# Patient Record
Sex: FEMALE | Race: OTHER | NOT HISPANIC OR LATINO | URBAN - METROPOLITAN AREA
[De-identification: names, ages, dates, MRNs, and addresses within clinical notes are randomized per-mention and may not be internally consistent; named-entity substitution may affect disease eponyms.]

---

## 2017-05-28 ENCOUNTER — EMERGENCY (EMERGENCY)
Facility: HOSPITAL | Age: 62
LOS: 0 days | Discharge: HOME | End: 2017-05-28

## 2017-06-28 DIAGNOSIS — I10 ESSENTIAL (PRIMARY) HYPERTENSION: ICD-10-CM

## 2017-06-28 DIAGNOSIS — N12 TUBULO-INTERSTITIAL NEPHRITIS, NOT SPECIFIED AS ACUTE OR CHRONIC: ICD-10-CM

## 2017-06-28 DIAGNOSIS — R50.9 FEVER, UNSPECIFIED: ICD-10-CM

## 2018-07-29 ENCOUNTER — EMERGENCY (EMERGENCY)
Facility: HOSPITAL | Age: 63
LOS: 0 days | Discharge: HOME | End: 2018-07-30
Admitting: PHYSICIAN ASSISTANT

## 2018-07-29 VITALS
RESPIRATION RATE: 18 BRPM | TEMPERATURE: 98 F | HEART RATE: 71 BPM | OXYGEN SATURATION: 98 % | DIASTOLIC BLOOD PRESSURE: 72 MMHG | SYSTOLIC BLOOD PRESSURE: 164 MMHG

## 2018-07-29 VITALS
WEIGHT: 133.38 LBS | DIASTOLIC BLOOD PRESSURE: 92 MMHG | OXYGEN SATURATION: 97 % | HEART RATE: 83 BPM | SYSTOLIC BLOOD PRESSURE: 202 MMHG | TEMPERATURE: 98 F | RESPIRATION RATE: 18 BRPM

## 2018-07-29 DIAGNOSIS — M54.41 LUMBAGO WITH SCIATICA, RIGHT SIDE: ICD-10-CM

## 2018-07-29 DIAGNOSIS — M54.5 LOW BACK PAIN: ICD-10-CM

## 2018-07-29 DIAGNOSIS — I10 ESSENTIAL (PRIMARY) HYPERTENSION: ICD-10-CM

## 2018-07-29 DIAGNOSIS — Z79.899 OTHER LONG TERM (CURRENT) DRUG THERAPY: ICD-10-CM

## 2018-07-30 RX ORDER — METHOCARBAMOL 500 MG/1
1 TABLET, FILM COATED ORAL
Qty: 14 | Refills: 0
Start: 2018-07-30 | End: 2018-08-05

## 2018-07-30 RX ORDER — METHOCARBAMOL 500 MG/1
500 TABLET, FILM COATED ORAL ONCE
Qty: 0 | Refills: 0 | Status: COMPLETED | OUTPATIENT
Start: 2018-07-30 | End: 2018-07-30

## 2018-07-30 RX ORDER — KETOROLAC TROMETHAMINE 30 MG/ML
30 SYRINGE (ML) INJECTION ONCE
Qty: 0 | Refills: 0 | Status: DISCONTINUED | OUTPATIENT
Start: 2018-07-30 | End: 2018-07-30

## 2018-07-30 RX ADMIN — Medication 30 MILLIGRAM(S): at 00:45

## 2018-07-30 RX ADMIN — METHOCARBAMOL 500 MILLIGRAM(S): 500 TABLET, FILM COATED ORAL at 00:45

## 2018-07-30 NOTE — ED PROVIDER NOTE - OBJECTIVE STATEMENT
pt c/o lower back pain, right sided radiating down right leg for a few days  similar episodes in the past, but unsure if she took anything for it  pt has not tried anything at home  presents with son who translates as per pts request  Denies incontinence/numbness/saddle parathesia/legs weakness and difficulty on ambulation. denies fever/chill/HA/dizziness/chest pain/sob/abd pain/n/v/d/ urinary sxs

## 2018-07-30 NOTE — ED PROVIDER NOTE - PROGRESS NOTE DETAILS
c/w mechanical back pain.  no neuro red flags.  sx are reproducible with position. aorta/cardiac not supported.  imaging not indicated at this time.  Discussed side effects from NSAIDs and muscle relaxant  Risk GI upset/gastritis/GERD from NSAIDs. May take OTC Prilosec.  Advised not to drive or operate heavy machinery while on Flexeril due to sedation risk. May take only at night if sedation occurs. pt feeling much better, ambulating without assistance

## 2020-01-31 NOTE — ED PROVIDER NOTE - SKIN, MLM
Spoke to patient son Edil Kumar.     Vandana Doan RN     Skin normal color for race, warm, dry and intact. No evidence of rash.

## 2020-04-03 ENCOUNTER — INPATIENT (INPATIENT)
Facility: HOSPITAL | Age: 65
LOS: 12 days | Discharge: HOME HEALTH | End: 2020-04-16
Attending: INTERNAL MEDICINE | Admitting: INTERNAL MEDICINE
Payer: MEDICARE

## 2020-04-03 VITALS
HEART RATE: 92 BPM | RESPIRATION RATE: 20 BRPM | OXYGEN SATURATION: 94 % | SYSTOLIC BLOOD PRESSURE: 119 MMHG | DIASTOLIC BLOOD PRESSURE: 59 MMHG | TEMPERATURE: 100 F

## 2020-04-03 PROBLEM — I10 ESSENTIAL (PRIMARY) HYPERTENSION: Chronic | Status: ACTIVE | Noted: 2018-07-30

## 2020-04-03 LAB
ALBUMIN SERPL ELPH-MCNC: 3.8 G/DL — SIGNIFICANT CHANGE UP (ref 3.5–5.2)
ALP SERPL-CCNC: 93 U/L — SIGNIFICANT CHANGE UP (ref 30–115)
ALT FLD-CCNC: 35 U/L — SIGNIFICANT CHANGE UP (ref 0–41)
ANION GAP SERPL CALC-SCNC: 17 MMOL/L — HIGH (ref 7–14)
AST SERPL-CCNC: 66 U/L — HIGH (ref 0–41)
BASOPHILS # BLD AUTO: 0.02 K/UL — SIGNIFICANT CHANGE UP (ref 0–0.2)
BASOPHILS NFR BLD AUTO: 0.3 % — SIGNIFICANT CHANGE UP (ref 0–1)
BILIRUB SERPL-MCNC: 0.3 MG/DL — SIGNIFICANT CHANGE UP (ref 0.2–1.2)
BUN SERPL-MCNC: 16 MG/DL — SIGNIFICANT CHANGE UP (ref 10–20)
CALCIUM SERPL-MCNC: 8.7 MG/DL — SIGNIFICANT CHANGE UP (ref 8.5–10.1)
CHLORIDE SERPL-SCNC: 96 MMOL/L — LOW (ref 98–110)
CO2 SERPL-SCNC: 23 MMOL/L — SIGNIFICANT CHANGE UP (ref 17–32)
CREAT SERPL-MCNC: 1.1 MG/DL — SIGNIFICANT CHANGE UP (ref 0.7–1.5)
EOSINOPHIL # BLD AUTO: 0 K/UL — SIGNIFICANT CHANGE UP (ref 0–0.7)
EOSINOPHIL NFR BLD AUTO: 0 % — SIGNIFICANT CHANGE UP (ref 0–8)
GLUCOSE SERPL-MCNC: 111 MG/DL — HIGH (ref 70–99)
HCT VFR BLD CALC: 37 % — SIGNIFICANT CHANGE UP (ref 37–47)
HGB BLD-MCNC: 12.9 G/DL — SIGNIFICANT CHANGE UP (ref 12–16)
IMM GRANULOCYTES NFR BLD AUTO: 0.5 % — HIGH (ref 0.1–0.3)
LYMPHOCYTES # BLD AUTO: 1.13 K/UL — LOW (ref 1.2–3.4)
LYMPHOCYTES # BLD AUTO: 17.7 % — LOW (ref 20.5–51.1)
MCHC RBC-ENTMCNC: 30.2 PG — SIGNIFICANT CHANGE UP (ref 27–31)
MCHC RBC-ENTMCNC: 34.9 G/DL — SIGNIFICANT CHANGE UP (ref 32–37)
MCV RBC AUTO: 86.7 FL — SIGNIFICANT CHANGE UP (ref 81–99)
MONOCYTES # BLD AUTO: 0.77 K/UL — HIGH (ref 0.1–0.6)
MONOCYTES NFR BLD AUTO: 12.1 % — HIGH (ref 1.7–9.3)
NEUTROPHILS # BLD AUTO: 4.43 K/UL — SIGNIFICANT CHANGE UP (ref 1.4–6.5)
NEUTROPHILS NFR BLD AUTO: 69.4 % — SIGNIFICANT CHANGE UP (ref 42.2–75.2)
NRBC # BLD: 0 /100 WBCS — SIGNIFICANT CHANGE UP (ref 0–0)
NT-PROBNP SERPL-SCNC: 115 PG/ML — SIGNIFICANT CHANGE UP (ref 0–300)
PLATELET # BLD AUTO: 198 K/UL — SIGNIFICANT CHANGE UP (ref 130–400)
POTASSIUM SERPL-MCNC: 4.3 MMOL/L — SIGNIFICANT CHANGE UP (ref 3.5–5)
POTASSIUM SERPL-SCNC: 4.3 MMOL/L — SIGNIFICANT CHANGE UP (ref 3.5–5)
PROT SERPL-MCNC: 7 G/DL — SIGNIFICANT CHANGE UP (ref 6–8)
RBC # BLD: 4.27 M/UL — SIGNIFICANT CHANGE UP (ref 4.2–5.4)
RBC # FLD: 12.3 % — SIGNIFICANT CHANGE UP (ref 11.5–14.5)
SODIUM SERPL-SCNC: 136 MMOL/L — SIGNIFICANT CHANGE UP (ref 135–146)
TROPONIN T SERPL-MCNC: <0.01 NG/ML — SIGNIFICANT CHANGE UP
WBC # BLD: 6.38 K/UL — SIGNIFICANT CHANGE UP (ref 4.8–10.8)
WBC # FLD AUTO: 6.38 K/UL — SIGNIFICANT CHANGE UP (ref 4.8–10.8)

## 2020-04-03 PROCEDURE — 71045 X-RAY EXAM CHEST 1 VIEW: CPT | Mod: 26

## 2020-04-03 PROCEDURE — 93010 ELECTROCARDIOGRAM REPORT: CPT

## 2020-04-03 PROCEDURE — 99285 EMERGENCY DEPT VISIT HI MDM: CPT

## 2020-04-03 RX ORDER — NIFEDIPINE 30 MG
30 TABLET, EXTENDED RELEASE 24 HR ORAL DAILY
Refills: 0 | Status: DISCONTINUED | OUTPATIENT
Start: 2020-04-03 | End: 2020-04-05

## 2020-04-03 RX ORDER — METOPROLOL TARTRATE 50 MG
1 TABLET ORAL
Qty: 0 | Refills: 0 | DISCHARGE

## 2020-04-03 RX ORDER — ENOXAPARIN SODIUM 100 MG/ML
40 INJECTION SUBCUTANEOUS AT BEDTIME
Refills: 0 | Status: DISCONTINUED | OUTPATIENT
Start: 2020-04-03 | End: 2020-04-12

## 2020-04-03 RX ORDER — METOPROLOL TARTRATE 50 MG
0 TABLET ORAL
Qty: 0 | Refills: 0 | DISCHARGE

## 2020-04-03 RX ORDER — METOPROLOL TARTRATE 50 MG
25 TABLET ORAL DAILY
Refills: 0 | Status: DISCONTINUED | OUTPATIENT
Start: 2020-04-03 | End: 2020-04-05

## 2020-04-03 RX ORDER — NIFEDIPINE 30 MG
1 TABLET, EXTENDED RELEASE 24 HR ORAL
Qty: 0 | Refills: 0 | DISCHARGE

## 2020-04-03 RX ADMIN — ENOXAPARIN SODIUM 40 MILLIGRAM(S): 100 INJECTION SUBCUTANEOUS at 23:00

## 2020-04-03 NOTE — H&P ADULT - HISTORY OF PRESENT ILLNESS
64 year old female [place] speaking with PMHx of hypertension presents with fevers and cough for 6 days.  Patient reports she has not experienced any shortness of breath. Admits to body aches.      Denies chest pain, diarrhea, n/v, abdominal pain, headache, dizziness.

## 2020-04-03 NOTE — ED ADULT NURSE NOTE - CHIEF COMPLAINT QUOTE
SOB and fever x 3days, sent in by [facility] for hypoxia, improved with O2 3L NC,  is being tested for COVID-19

## 2020-04-03 NOTE — ED ADULT TRIAGE NOTE - CHIEF COMPLAINT QUOTE
SOB and fever x 3days, sent in by [facility] for hypoxia, improved with O2 3L NC,  is being tested for COVID-19

## 2020-04-03 NOTE — ED PROVIDER NOTE - CARE PLAN
Principal Discharge DX:	Hypoxia  Secondary Diagnosis:	Shortness of breath  Secondary Diagnosis:	Opacity of lung on imaging study

## 2020-04-03 NOTE — H&P ADULT - NSHPPHYSICALEXAM_GEN_ALL_CORE
T(F): 99.7 (04-03-20 @ 15:51), Max: 99.7 (04-03-20 @ 15:51)  HR: 81 (04-03-20 @ 17:15) (81 - 92)  BP: 116/57 (04-03-20 @ 17:15) (116/57 - 119/59)  RR: 20 (04-03-20 @ 17:15) (20 - 20)  SpO2: 97% (04-03-20 @ 17:15) (94% - 97%) on 2 L NC

## 2020-04-03 NOTE — H&P ADULT - ASSESSMENT
64 year old female [place] speaking with PMHx of hypertension presents with fevers and cough for 6 days.     #Fevers/Non-productive cough/ SOB - rule out respiratory viral syndrome.  Rule out COVID-19  - No recent travel or CoVid contacts (that patient is aware of).   - First symptom onset: 3/28/20  - Isolation precautions. Limit amount of healthcare professional contact.   - f/u COVID19 swab (received by lab)  - f/u procalcitonin, CRP,    - Start on hydroxychloroquine (CXR changes and hypoxic resp. failure requiring supplemental oxygen) (400 mg BID X 2 doses, then 200 mg BID X 5 days)  - Saturating 94% on Room air and 97% on (2L NC)  - Tylenol PRN for fevers  - EP for QTC monitoring    Diet: Regular  Prophylaxis: enoxaparin  Activity: out of bed to chair + ambulate as tolerated  CHG bath  #Dispo: From home    #Code Status: Full code

## 2020-04-03 NOTE — ED ADULT NURSE NOTE - NSIMPLEMENTINTERV_GEN_ALL_ED
Implemented All Universal Safety Interventions:  [place] to call system. Call bell, personal items and telephone within reach. Instruct patient to call for assistance. Room bathroom lighting operational. Non-slip footwear when patient is off stretcher. Physically safe environment: no spills, clutter or unnecessary equipment. Stretcher in lowest position, wheels locked, appropriate side rails in place.

## 2020-04-03 NOTE — ED PROVIDER NOTE - OBJECTIVE STATEMENT
65yo F with PMH of HTN presenting to ED with SOB and intermittent fevers x 6 days (tmax of 103).  was recently sick and tested for covid, results pending. Denies any HA, significant cough, CP, abdominal pain, n/v/d, injuries/traumas/falls, or other complaints. Patient was sent in by  2/2 being hypoxic to low 90's, placed on 3L of NC and responded well.

## 2020-04-03 NOTE — H&P ADULT - ATTENDING COMMENTS
HPI:  64 year old female Georgian speaking with PMHx of hypertension presents with fevers and cough for 6 days.  Patient reports she has not experienced any shortness of breath. Admits to body aches.      Denies chest pain, diarrhea, n/v, abdominal pain, headache, dizziness. (03 Apr 2020 19:15)    REVIEW OF SYSTEMS: see cc/HPI  CONSTITUTIONAL: No weakness, fevers or chills  EYES/ENT: No visual changes;  No vertigo or throat pain   NECK: No pain or stiffness  RESPIRATORY: No cough, wheezing, hemoptysis; No shortness of breath  CARDIOVASCULAR: No chest pain or palpitations  GASTROINTESTINAL: No abdominal or epigastric pain. No nausea, vomiting, or hematemesis; No diarrhea or constipation. No melena or hematochezia.  GENITOURINARY: No dysuria, frequency or hematuria  NEUROLOGICAL: No numbness or weakness  SKIN: No itching, rashes    Physical Exam:    General: WN/WD NAD  Neurology: A&Ox3, nonfocal, follows commands  Eyes: PERRLA/ EOMI  ENT/Neck: Neck supple, trachea midline, No JVD  Respiratory: CTA B/L, No wheezing, rales, rhonchi  CV: Normal rate regular rhythm, S1S2, no murmurs, rubs or gallops  Abdominal: Soft, NT, ND +BS,   Extremities: No edema, + peripheral pulses  Skin: No Rashes, Hematoma, Ecchymosis  Incisions:   Tubes: HPI:  64 year old female Scottish speaking with PMHx of hypertension presents with fevers and cough for 6 days.  Patient reports she has not experienced any shortness of breath. Admits to body aches.      Denies chest pain, diarrhea, n/v, abdominal pain, headache, dizziness. (03 Apr 2020 19:15)    REVIEW OF SYSTEMS: see cc/HPI  CONSTITUTIONAL: No weakness, fevers or chills  EYES/ENT: No visual changes;  No vertigo or throat pain   NECK: No pain or stiffness  RESPIRATORY: (+) cough, wheezing, hemoptysis; (+) shortness of breath, see cc/HPI  CARDIOVASCULAR: No chest pain or palpitations  GASTROINTESTINAL: No abdominal or epigastric pain. No nausea, vomiting, or hematemesis; No diarrhea or constipation. No melena or hematochezia.  GENITOURINARY: No dysuria, frequency or hematuria  NEUROLOGICAL: No numbness or weakness  SKIN: No itching, rashes    Physical Exam:  General: WN/WD NAD  Neurology: A&Ox3, nonfocal, follows commands  Eyes: PERRLA/ EOMI  ENT/Neck: Neck supple, trachea midline, No JVD  Respiratory: B/L rales, No wheezing, NO rhonchi  CV: Normal rate regular rhythm, S1S2, no murmurs, rubs or gallops  Abdominal: Soft, NT, ND +BS,   Extremities: No edema, + peripheral pulses  Skin: No Rashes, Hematoma, Ecchymosis  Incisions:   Tubes:    A/p  Fever/ cough/ dyspnea / hypoxemia - Acute resp failure / bilateral pneumonia r/o COVID-19  -Isolation   -O2 supplementation , pulse ox monitoring   -Agree w/ Plaquenil  -CRP and Procalcitonin   -Tylenol PRN   -ID eval     HTN - stable     DVT prophylaxis

## 2020-04-03 NOTE — ED PROVIDER NOTE - PROGRESS NOTE DETAILS
Raheel- Patient hypoxic when off O2 (87% off O2), O2 sat high 90's on 3L NC. CXR with b/l opacities. Will admit.

## 2020-04-03 NOTE — H&P ADULT - NSHPLABSRESULTS_GEN_ALL_CORE
12.9   6.38  )-----------( 198      ( 03 Apr 2020 16:52 )             37.0     04-03 @ 16:52  Auto Basophil #0.02 K/uL  Auto Basophil %0.3 %  Auto Eosinophil #0.00 K/uL  Auto Eosiniophil %0.0 %  Auto Immature Granulocyte %0.5 %<H>  Auto Lymphocyte #1.13 K/uL<L>  Auto Lymphocyte %17.7 %<L>  Auto Monocyte #0.77 K/uL<H>  Auto Monocyte %12.1 %<H>  Auto Neutrophil #4.43 K/uL  Auto Neutrophil %69.4 %        136  |  96<L>  |  16  ----------------------------<  111<H>  04-03 @ 16:52  4.3   |  23  |  1.1        Ca    8.7     TPro  7.0  /  Alb  3.8  /  TBili  0.3  /  DBili  x   /  AST  66<H>  /  ALT  35  /  AlkPhos  93

## 2020-04-03 NOTE — ED PROVIDER NOTE - PHYSICAL EXAMINATION
PHYSICAL EXAM: I have reviewed current vital signs.  GENERAL: NAD, elderly.  HEAD:  Normocephalic, atraumatic.  EYES: Conjunctiva and sclera clear.  ENT: MMM, no erythema/exudates.  NECK: Supple, FROM.  CHEST/LUNG: Clear to auscultation bilaterally; no wheezes, rales, or rhonchi. Hypoxic when off O2 (87% off O2), O2 sat high 90's on 3L NC.  HEART: Regular rate and rhythm, normal S1 and S2; no murmurs, rubs, or gallops.  ABDOMEN: Soft, nontender, nondistended.  EXTREMITIES:  2+ peripheral pulses; FROM.  NEUROLOGY: A&O x 3. Motor 5/5. No focal neurological deficits.  SKIN: Warm and dry.

## 2020-04-03 NOTE — ED PROVIDER NOTE - NS ED ROS FT
Constitutional:  +F/c.  Eyes:  No visual changes, eye pain, or discharge.  ENT:  No hearing changes. No sore throat.  Neck:  No neck pain or stiffness.  Cardiac:  No CP or edema.  Resp:  +SOB.  GI:  No vomiting, diarrhea, or abdominal pain.  MSK:  No myalgias.  Neuro:  No headache, dizziness, or weakness.  Skin:  No skin rash.

## 2020-04-03 NOTE — ED PROVIDER NOTE - ATTENDING CONTRIBUTION TO CARE
65 yo f with pmh of htn, sent by [facility] for sob and hypoxia.  pt says has been having fever for about 6 days.  pt's  was tested for covid but no results yet.  no cough, cp, abd pain, n/v/d.  exam: nad, ncat, perrl, eomi, mmm, rrr, ctab, abd soft, nt,nd aox3, imp: pt with fever, sob and hypoxia, concern for covid.  will check labs, cxr, swab.  pt requiring supplemental O2, will admit for further eval and management

## 2020-04-03 NOTE — ED PROVIDER NOTE - CLINICAL SUMMARY MEDICAL DECISION MAKING FREE TEXT BOX
pt with fever, sob and hypoxia, concern for covid.  will check labs, cxr, swab.  pt requiring supplemental O2, will admit for further eval and management

## 2020-04-04 LAB
CRP SERPL-MCNC: 10.1 MG/DL — HIGH (ref 0–0.4)
HCV AB S/CO SERPL IA: 0.03 COI — SIGNIFICANT CHANGE UP
HCV AB SERPL-IMP: SIGNIFICANT CHANGE UP
PROCALCITONIN SERPL-MCNC: 0.14 NG/ML — HIGH (ref 0.02–0.1)
SARS-COV-2 RNA SPEC QL NAA+PROBE: DETECTED

## 2020-04-04 PROCEDURE — 99223 1ST HOSP IP/OBS HIGH 75: CPT

## 2020-04-04 RX ORDER — HYDROXYCHLOROQUINE SULFATE 200 MG
400 TABLET ORAL EVERY 12 HOURS
Refills: 0 | Status: COMPLETED | OUTPATIENT
Start: 2020-04-04 | End: 2020-04-04

## 2020-04-04 RX ORDER — AZITHROMYCIN 500 MG/1
250 TABLET, FILM COATED ORAL DAILY
Refills: 0 | Status: COMPLETED | OUTPATIENT
Start: 2020-04-04 | End: 2020-04-08

## 2020-04-04 RX ORDER — AZITHROMYCIN 500 MG/1
500 TABLET, FILM COATED ORAL ONCE
Refills: 0 | Status: COMPLETED | OUTPATIENT
Start: 2020-04-04 | End: 2020-04-04

## 2020-04-04 RX ORDER — HYDROXYCHLOROQUINE SULFATE 200 MG
TABLET ORAL
Refills: 0 | Status: COMPLETED | OUTPATIENT
Start: 2020-04-04 | End: 2020-04-08

## 2020-04-04 RX ORDER — HYDROXYCHLOROQUINE SULFATE 200 MG
200 TABLET ORAL EVERY 12 HOURS
Refills: 0 | Status: COMPLETED | OUTPATIENT
Start: 2020-04-05 | End: 2020-04-08

## 2020-04-04 RX ORDER — ACETAMINOPHEN 500 MG
650 TABLET ORAL ONCE
Refills: 0 | Status: COMPLETED | OUTPATIENT
Start: 2020-04-04 | End: 2020-04-04

## 2020-04-04 RX ADMIN — Medication 30 MILLIGRAM(S): at 05:24

## 2020-04-04 RX ADMIN — Medication 400 MILLIGRAM(S): at 21:41

## 2020-04-04 RX ADMIN — Medication 650 MILLIGRAM(S): at 18:34

## 2020-04-04 RX ADMIN — AZITHROMYCIN 500 MILLIGRAM(S): 500 TABLET, FILM COATED ORAL at 18:32

## 2020-04-04 RX ADMIN — Medication 25 MILLIGRAM(S): at 05:24

## 2020-04-04 RX ADMIN — Medication 400 MILLIGRAM(S): at 12:46

## 2020-04-04 RX ADMIN — ENOXAPARIN SODIUM 40 MILLIGRAM(S): 100 INJECTION SUBCUTANEOUS at 21:40

## 2020-04-04 NOTE — CHART NOTE - NSCHARTNOTEFT_GEN_A_CORE
sob and cough. feels okay when at rest    94% on 5L NC  NAD  very significant rhonchi R>L but conformable on NC  euvolemic appearing     63yo F c PMHx HTN here with acute hypoxic respiratory failure 2/2 covid pneumonia    azithromycin/ plaquenil  monitor qtc with EPS, qtc on admission 460  dvt ppx  monitor o2 requirements  check d-dimer, ferritin, crp, and repeat cxr tomorrow- exam is impressive

## 2020-04-05 LAB
ALBUMIN SERPL ELPH-MCNC: 3.7 G/DL — SIGNIFICANT CHANGE UP (ref 3.5–5.2)
ALP SERPL-CCNC: 96 U/L — SIGNIFICANT CHANGE UP (ref 30–115)
ALT FLD-CCNC: 33 U/L — SIGNIFICANT CHANGE UP (ref 0–41)
ANION GAP SERPL CALC-SCNC: 16 MMOL/L — HIGH (ref 7–14)
AST SERPL-CCNC: 66 U/L — HIGH (ref 0–41)
BASOPHILS # BLD AUTO: 0.01 K/UL — SIGNIFICANT CHANGE UP (ref 0–0.2)
BASOPHILS NFR BLD AUTO: 0.1 % — SIGNIFICANT CHANGE UP (ref 0–1)
BILIRUB SERPL-MCNC: 0.4 MG/DL — SIGNIFICANT CHANGE UP (ref 0.2–1.2)
BUN SERPL-MCNC: 16 MG/DL — SIGNIFICANT CHANGE UP (ref 10–20)
CALCIUM SERPL-MCNC: 8.8 MG/DL — SIGNIFICANT CHANGE UP (ref 8.5–10.1)
CHLORIDE SERPL-SCNC: 96 MMOL/L — LOW (ref 98–110)
CO2 SERPL-SCNC: 23 MMOL/L — SIGNIFICANT CHANGE UP (ref 17–32)
CREAT SERPL-MCNC: 1.1 MG/DL — SIGNIFICANT CHANGE UP (ref 0.7–1.5)
D DIMER BLD IA.RAPID-MCNC: 262 NG/ML DDU — HIGH (ref 0–230)
EOSINOPHIL # BLD AUTO: 0 K/UL — SIGNIFICANT CHANGE UP (ref 0–0.7)
EOSINOPHIL NFR BLD AUTO: 0 % — SIGNIFICANT CHANGE UP (ref 0–8)
GLUCOSE SERPL-MCNC: 97 MG/DL — SIGNIFICANT CHANGE UP (ref 70–99)
HCT VFR BLD CALC: 39.5 % — SIGNIFICANT CHANGE UP (ref 37–47)
HGB BLD-MCNC: 13.4 G/DL — SIGNIFICANT CHANGE UP (ref 12–16)
IMM GRANULOCYTES NFR BLD AUTO: 0.6 % — HIGH (ref 0.1–0.3)
LDH SERPL L TO P-CCNC: 418 — HIGH (ref 50–242)
LYMPHOCYTES # BLD AUTO: 0.88 K/UL — LOW (ref 1.2–3.4)
LYMPHOCYTES # BLD AUTO: 9.5 % — LOW (ref 20.5–51.1)
MAGNESIUM SERPL-MCNC: 2.1 MG/DL — SIGNIFICANT CHANGE UP (ref 1.8–2.4)
MCHC RBC-ENTMCNC: 29.4 PG — SIGNIFICANT CHANGE UP (ref 27–31)
MCHC RBC-ENTMCNC: 33.9 G/DL — SIGNIFICANT CHANGE UP (ref 32–37)
MCV RBC AUTO: 86.6 FL — SIGNIFICANT CHANGE UP (ref 81–99)
MONOCYTES # BLD AUTO: 0.55 K/UL — SIGNIFICANT CHANGE UP (ref 0.1–0.6)
MONOCYTES NFR BLD AUTO: 5.9 % — SIGNIFICANT CHANGE UP (ref 1.7–9.3)
NEUTROPHILS # BLD AUTO: 7.81 K/UL — HIGH (ref 1.4–6.5)
NEUTROPHILS NFR BLD AUTO: 83.9 % — HIGH (ref 42.2–75.2)
NRBC # BLD: 0 /100 WBCS — SIGNIFICANT CHANGE UP (ref 0–0)
PLATELET # BLD AUTO: 257 K/UL — SIGNIFICANT CHANGE UP (ref 130–400)
POTASSIUM SERPL-MCNC: 4.3 MMOL/L — SIGNIFICANT CHANGE UP (ref 3.5–5)
POTASSIUM SERPL-SCNC: 4.3 MMOL/L — SIGNIFICANT CHANGE UP (ref 3.5–5)
PROT SERPL-MCNC: 7.1 G/DL — SIGNIFICANT CHANGE UP (ref 6–8)
RBC # BLD: 4.56 M/UL — SIGNIFICANT CHANGE UP (ref 4.2–5.4)
RBC # FLD: 12.4 % — SIGNIFICANT CHANGE UP (ref 11.5–14.5)
SODIUM SERPL-SCNC: 135 MMOL/L — SIGNIFICANT CHANGE UP (ref 135–146)
WBC # BLD: 9.31 K/UL — SIGNIFICANT CHANGE UP (ref 4.8–10.8)
WBC # FLD AUTO: 9.31 K/UL — SIGNIFICANT CHANGE UP (ref 4.8–10.8)

## 2020-04-05 PROCEDURE — 99233 SBSQ HOSP IP/OBS HIGH 50: CPT

## 2020-04-05 PROCEDURE — 71045 X-RAY EXAM CHEST 1 VIEW: CPT | Mod: 26

## 2020-04-05 RX ORDER — METOPROLOL TARTRATE 50 MG
25 TABLET ORAL DAILY
Refills: 0 | Status: DISCONTINUED | OUTPATIENT
Start: 2020-04-05 | End: 2020-04-14

## 2020-04-05 RX ADMIN — AZITHROMYCIN 250 MILLIGRAM(S): 500 TABLET, FILM COATED ORAL at 12:42

## 2020-04-05 RX ADMIN — ENOXAPARIN SODIUM 40 MILLIGRAM(S): 100 INJECTION SUBCUTANEOUS at 22:04

## 2020-04-05 RX ADMIN — Medication 200 MILLIGRAM(S): at 12:42

## 2020-04-05 RX ADMIN — Medication 200 MILLIGRAM(S): at 22:04

## 2020-04-05 NOTE — PROGRESS NOTE ADULT - SUBJECTIVE AND OBJECTIVE BOX
NITA LEE  64y  Female      Patient is a 64y old  Female who presents with a chief complaint of fevers cough (04 Apr 2020 01:54)      INTERVAL HPI/OVERNIGHT EVENTS: says she is not sob when wearing nrb. not eating much today      REVIEW OF SYSTEMS:  as above  All other review of systems negative    T(C): 37.7 (04-05-20 @ 09:04), Max: 38.9 (04-04-20 @ 17:29)  HR: 86 (04-05-20 @ 09:04) (81 - 92)  BP: 95/30 (04-05-20 @ 09:04) (84/47 - 99/57)  RR: 18 (04-05-20 @ 09:04) (18 - 18)  SpO2: 76% (04-05-20 @ 12:23) (76% - 92%)  Wt(kg): --Vital Signs Last 24 Hrs  T(C): 37.7 (05 Apr 2020 09:04), Max: 38.9 (04 Apr 2020 17:29)  T(F): 99.9 (05 Apr 2020 09:04), Max: 102 (04 Apr 2020 17:29)  HR: 86 (05 Apr 2020 09:04) (81 - 92)  BP: 95/30 (05 Apr 2020 09:04) (84/47 - 99/57)  BP(mean): --  RR: 18 (05 Apr 2020 09:04) (18 - 18)  SpO2: 76% (05 Apr 2020 12:23) (76% - 92%)        PHYSICAL EXAM:  GENERAL: appearing relatively comfortable  PSYCH: no agitation, baseline mentation  NERVOUS SYSTEM:  Alert & Oriented X3, no new focal deficits  PULMONARY: significant R>L diffuse rhonchi, on NRB but speaking full sentences  CARDIOVASCULAR: Regular rate and rhythm; No murmurs, rubs, or gallops  GI: Soft, Nontender, Nondistended; Bowel sounds present  EXTREMITIES:  2+ Peripheral Pulses, No clubbing, cyanosis, or edema    Consultant(s) Notes Reviewed:  [x ] YES  [ ] NO    Discussed with Consultants/Other Providers [ x] YES     LABS                          13.4   9.31  )-----------( 257      ( 05 Apr 2020 07:13 )             39.5     04-05    135  |  96<L>  |  16  ----------------------------<  97  4.3   |  23  |  1.1    Ca    8.8      05 Apr 2020 07:13  Mg     2.1     04-05    TPro  7.1  /  Alb  3.7  /  TBili  0.4  /  DBili  x   /  AST  66<H>  /  ALT  33  /  AlkPhos  96  04-05          Lactate Trend    CARDIAC MARKERS ( 03 Apr 2020 16:52 )  x     / <0.01 ng/mL / x     / x     / x          CAPILLARY BLOOD GLUCOSE            RADIOLOGY & ADDITIONAL TESTS:    Imaging Personally Reviewed:  [ ] YES  [ ] NO    HEALTH ISSUES - PROBLEM Dx:

## 2020-04-05 NOTE — CHART NOTE - NSCHARTNOTEFT_GEN_A_CORE
Event monitor placed today and instructions provided.  Please re-call EP when monitoring is no longer indicated or patient is being discharged.    EP spectra [id number]

## 2020-04-05 NOTE — PROGRESS NOTE ADULT - ASSESSMENT
63yo F c PMHx HTN here with acute hypoxic respiratory failure 2/2 covid pneumonia    azithromycin/ plaquenil  monitor qtc with EPS, qtc on admission 460  dvt ppx  monitor o2 requirements-> increasing now on NRB  repeat CXR is similar to prior diffuse b/l infiltrates  f/u ferritin- if markedly elevated-> ID eval to see if patient is candidate for Anakinra  HIGH RISK, may need intubation in the upcoming days- clinically appears comfortable but rhonchi and o2 requirements are extensive    Provider Hand off  supportive care, monitor tenuous respiratory status  acute

## 2020-04-06 LAB
ALBUMIN SERPL ELPH-MCNC: 3.3 G/DL — LOW (ref 3.5–5.2)
ALP SERPL-CCNC: 97 U/L — SIGNIFICANT CHANGE UP (ref 30–115)
ALT FLD-CCNC: 30 U/L — SIGNIFICANT CHANGE UP (ref 0–41)
ANION GAP SERPL CALC-SCNC: 15 MMOL/L — HIGH (ref 7–14)
AST SERPL-CCNC: 61 U/L — HIGH (ref 0–41)
BASOPHILS # BLD AUTO: 0.02 K/UL — SIGNIFICANT CHANGE UP (ref 0–0.2)
BASOPHILS NFR BLD AUTO: 0.2 % — SIGNIFICANT CHANGE UP (ref 0–1)
BILIRUB SERPL-MCNC: 0.4 MG/DL — SIGNIFICANT CHANGE UP (ref 0.2–1.2)
BUN SERPL-MCNC: 25 MG/DL — HIGH (ref 10–20)
CALCIUM SERPL-MCNC: 8.4 MG/DL — LOW (ref 8.5–10.1)
CHLORIDE SERPL-SCNC: 95 MMOL/L — LOW (ref 98–110)
CO2 SERPL-SCNC: 23 MMOL/L — SIGNIFICANT CHANGE UP (ref 17–32)
CREAT SERPL-MCNC: 1.3 MG/DL — SIGNIFICANT CHANGE UP (ref 0.7–1.5)
CRP SERPL-MCNC: 21.36 MG/DL — HIGH (ref 0–0.4)
EOSINOPHIL # BLD AUTO: 0 K/UL — SIGNIFICANT CHANGE UP (ref 0–0.7)
EOSINOPHIL NFR BLD AUTO: 0 % — SIGNIFICANT CHANGE UP (ref 0–8)
GLUCOSE SERPL-MCNC: 78 MG/DL — SIGNIFICANT CHANGE UP (ref 70–99)
HCT VFR BLD CALC: 38.2 % — SIGNIFICANT CHANGE UP (ref 37–47)
HGB BLD-MCNC: 13.2 G/DL — SIGNIFICANT CHANGE UP (ref 12–16)
IMM GRANULOCYTES NFR BLD AUTO: 0.7 % — HIGH (ref 0.1–0.3)
LYMPHOCYTES # BLD AUTO: 1.29 K/UL — SIGNIFICANT CHANGE UP (ref 1.2–3.4)
LYMPHOCYTES # BLD AUTO: 11.4 % — LOW (ref 20.5–51.1)
MAGNESIUM SERPL-MCNC: 2.2 MG/DL — SIGNIFICANT CHANGE UP (ref 1.8–2.4)
MCHC RBC-ENTMCNC: 30.1 PG — SIGNIFICANT CHANGE UP (ref 27–31)
MCHC RBC-ENTMCNC: 34.6 G/DL — SIGNIFICANT CHANGE UP (ref 32–37)
MCV RBC AUTO: 87 FL — SIGNIFICANT CHANGE UP (ref 81–99)
MONOCYTES # BLD AUTO: 0.47 K/UL — SIGNIFICANT CHANGE UP (ref 0.1–0.6)
MONOCYTES NFR BLD AUTO: 4.2 % — SIGNIFICANT CHANGE UP (ref 1.7–9.3)
NEUTROPHILS # BLD AUTO: 9.46 K/UL — HIGH (ref 1.4–6.5)
NEUTROPHILS NFR BLD AUTO: 83.5 % — HIGH (ref 42.2–75.2)
NRBC # BLD: 0 /100 WBCS — SIGNIFICANT CHANGE UP (ref 0–0)
PLATELET # BLD AUTO: 293 K/UL — SIGNIFICANT CHANGE UP (ref 130–400)
POTASSIUM SERPL-MCNC: 4.5 MMOL/L — SIGNIFICANT CHANGE UP (ref 3.5–5)
POTASSIUM SERPL-SCNC: 4.5 MMOL/L — SIGNIFICANT CHANGE UP (ref 3.5–5)
PROCALCITONIN SERPL-MCNC: 0.22 NG/ML — HIGH (ref 0.02–0.1)
PROT SERPL-MCNC: 6.7 G/DL — SIGNIFICANT CHANGE UP (ref 6–8)
RBC # BLD: 4.39 M/UL — SIGNIFICANT CHANGE UP (ref 4.2–5.4)
RBC # FLD: 12.3 % — SIGNIFICANT CHANGE UP (ref 11.5–14.5)
SODIUM SERPL-SCNC: 133 MMOL/L — LOW (ref 135–146)
WBC # BLD: 11.32 K/UL — HIGH (ref 4.8–10.8)
WBC # FLD AUTO: 11.32 K/UL — HIGH (ref 4.8–10.8)

## 2020-04-06 PROCEDURE — 99232 SBSQ HOSP IP/OBS MODERATE 35: CPT

## 2020-04-06 RX ORDER — SODIUM CHLORIDE 9 MG/ML
500 INJECTION INTRAMUSCULAR; INTRAVENOUS; SUBCUTANEOUS ONCE
Refills: 0 | Status: COMPLETED | OUTPATIENT
Start: 2020-04-06 | End: 2020-04-06

## 2020-04-06 RX ORDER — ACETAMINOPHEN 500 MG
650 TABLET ORAL EVERY 6 HOURS
Refills: 0 | Status: DISCONTINUED | OUTPATIENT
Start: 2020-04-06 | End: 2020-04-16

## 2020-04-06 RX ADMIN — ENOXAPARIN SODIUM 40 MILLIGRAM(S): 100 INJECTION SUBCUTANEOUS at 20:59

## 2020-04-06 RX ADMIN — Medication 200 MILLIGRAM(S): at 12:04

## 2020-04-06 RX ADMIN — Medication 200 MILLIGRAM(S): at 20:58

## 2020-04-06 RX ADMIN — SODIUM CHLORIDE 1000 MILLILITER(S): 9 INJECTION INTRAMUSCULAR; INTRAVENOUS; SUBCUTANEOUS at 08:01

## 2020-04-06 RX ADMIN — Medication 650 MILLIGRAM(S): at 20:57

## 2020-04-06 RX ADMIN — AZITHROMYCIN 250 MILLIGRAM(S): 500 TABLET, FILM COATED ORAL at 12:04

## 2020-04-06 NOTE — PROGRESS NOTE ADULT - SUBJECTIVE AND OBJECTIVE BOX
I made rounds today with the treatment team including the hospitalist, residents,  nurses and  and discussed the patient's current medical status and discharge  planning needs. in addition I reviewed  the chart as well as laboratoory  data.    T(C): 37.4 (04-06-20 @ 16:26), Max: 37.4 (04-06-20 @ 16:26)  HR: 100 (04-06-20 @ 16:26) (93 - 103)  BP: 99/57 (04-06-20 @ 16:26) (76/43 - 99/57)  RR: 20 (04-06-20 @ 16:26) (18 - 22)  SpO2: 91% (04-06-20 @ 16:26) (90% - 94%)           I reached out to the patient's health care proxy/ responsible family member-                   [   x  ]  I left a message with family that pt on oxygen stable so far ,i will call back tomorrow              [     ] I spent 5-10 minutes on the above discussing medical issues with team members and family    [  x   ] I spent 11-20 minutes on the above discussing medical issues with team members and family    [     ] I spent 21-30 minutes on the above discussing medical issues with team members and family

## 2020-04-06 NOTE — PROGRESS NOTE ADULT - SUBJECTIVE AND OBJECTIVE BOX
NITA LEE  64y  Female      Patient is a 64y old  Female who presents with a chief complaint of fevers cough (04 Apr 2020 01:54)      INTERVAL HPI/OVERNIGHT EVENTS: says she is not sob when wearing nrb. not eating much today      REVIEW OF SYSTEMS:  as above  All other review of systems negative    Vital Signs Last 24 Hrs  T(C): 37.2 (06 Apr 2020 07:54), Max: 37.3 (06 Apr 2020 00:47)  T(F): 98.9 (06 Apr 2020 07:54), Max: 99.1 (06 Apr 2020 00:47)  HR: 93 (06 Apr 2020 07:54) (89 - 103)  BP: 93/52 (06 Apr 2020 09:24) (76/43 - 96/53)  BP(mean): --  RR: 20 (06 Apr 2020 09:24) (18 - 20)  SpO2: 94% (06 Apr 2020 07:54) (76% - 95%)  GENERAL: appearing relatively comfortable  PSYCH: no agitation, baseline mentation  NERVOUS SYSTEM:  Alert & Oriented X3, no new focal deficits  PULMONARY: significant R>L diffuse rhonchi, on NRB but speaking full sentences  CARDIOVASCULAR: Regular rate and rhythm; No murmurs, rubs, or gallops  GI: Soft, Nontender, Nondistended; Bowel sounds present  EXTREMITIES:  2+ Peripheral Pulses, No clubbing, cyanosis, or edema    Consultant(s) Notes Reviewed:  [x ] YES  [ ] NO    Discussed with Consultants/Other Providers [ x] YES     LABS                           13.2   11.32 )-----------( 293      ( 06 Apr 2020 06:40 )             38.2   Lactate Trend    CARDIAC MARKERS ( 03 Apr 2020 16:52 )  x     / <0.01 ng/mL / x     / x     / x        04-06    133<L>  |  95<L>  |  25<H>  ----------------------------<  78  4.5   |  23  |  1.3    Ca    8.4<L>      06 Apr 2020 06:40  Mg     2.2     04-06    TPro  6.7  /  Alb  3.3<L>  /  TBili  0.4  /  DBili  x   /  AST  61<H>  /  ALT  30  /  AlkPhos  97  04-06      CAPILLARY BLOOD GLUCOSE            RADIOLOGY & ADDITIONAL TESTS:    Imaging Personally Reviewed:  [ ] YES  [ ] NO    MEDICATIONS  (STANDING):  azithromycin   Tablet 250 milliGRAM(s) Oral daily  enoxaparin Injectable 40 milliGRAM(s) SubCutaneous at bedtime  hydroxychloroquine   Oral   hydroxychloroquine 200 milliGRAM(s) Oral every 12 hours  metoprolol succinate ER 25 milliGRAM(s) Oral daily    MEDICATIONS  (PRN):  HEALTH ISSUES - PROBLEM Dx:  hypotensive this am was give 2nd bolus of ns with bp reultsd of 95/56            Assessment and Plan:   · Assessment	  65yo F c PMHx HTN here with acute hypoxic respiratory failure 2/2 covid pneumonia    azithromycin/ plaquenil  monitor qtc with EPS, qtc on admission 460  dvt ppx  monitor o2 requirements-> inow on NRB  repeat CXR is similar to prior diffuse b/l infiltrates  f/u ferritin- if markedly elevated-> ID eval to see if patient is candidate for Anakinra  HIGH RISK, may need intubation in the upcoming days- clinically appears comfortable but rhonchi and o2 requirements are extensive    Provider Hand off  supportive care, monitor tenuous respiratory status  acute    Plan discussed with above.

## 2020-04-06 NOTE — PROGRESS NOTE ADULT - SUBJECTIVE AND OBJECTIVE BOX
T H I S   I S    N O  T   A    F I N A L I Z E D   N O T NITA TOWNSEND  64y, Female  Allergy: No Known Allergies    Hospital Day: 3d    Patient seen and examined earlier today.     PMH/PSH:  PAST MEDICAL & SURGICAL HISTORY:  HTN (hypertension)    LAST 24-Hr EVENTS:      VITALS:  T(F): 98.9 (04-06-20 @ 07:54), Max: 99.1 (04-06-20 @ 00:47)  HR: 93 (04-06-20 @ 07:54)  BP: 93/52 (04-06-20 @ 09:24) (76/43 - 96/53)  RR: 20 (04-06-20 @ 09:24)  SpO2: 94% (04-06-20 @ 07:54) on NRFM    TESTS & MEASUREMENTS:                          13.2   11.32 )-----------( 293      ( 06 Apr 2020 06:40 )             38.2       04-06    133<L>  |  95<L>  |  25<H>  ----------------------------<  78  4.5   |  23  |  1.3    Ca    8.4<L>      06 Apr 2020 06:40  Mg     2.2     04-06    TPro  6.7  /  Alb  3.3<L>  /  TBili  0.4  /  DBili  x   /  AST  61<H>  /  ALT  30  /  AlkPhos  97  04-06    LIVER FUNCTIONS - ( 06 Apr 2020 06:40 )  Alb: 3.3 g/dL / Pro: 6.7 g/dL / ALK PHOS: 97 U/L / ALT: 30 U/L / AST: 61 U/L / GGT: x           Procalcitonin, Serum: 0.14 ng/mL (04-03-20 @ 16:52)  D-Dimer Assay, Quantitative: 262 ng/mL DDU (04-05-20 @ 07:13)      Serum Pro-Brain Natriuretic Peptide: 115 pg/mL (04-03-20 @ 19:34)  COVID-19 PCR: Detected (04-03-20 @ 19:00)      RADIOLOGY, ECG, & ADDITIONAL TESTS:  Bilateral opacities unchanged. No pleural effusion or air leak  < from: 12 Lead ECG (04.03.20 @ 18:28) >  QTC Calculation(Bezet) 460 ms        MEDICATIONS:  MEDICATIONS  (STANDING):  azithromycin   Tablet 250 milliGRAM(s) Oral daily  enoxaparin Injectable 40 milliGRAM(s) SubCutaneous at bedtime  hydroxychloroquine   Oral   hydroxychloroquine 200 milliGRAM(s) Oral every 12 hours  metoprolol succinate ER 25 milliGRAM(s) Oral daily      HOME MEDICATIONS:  metoprolol succinate 25 mg oral tablet, extended release (04-03)  NIFEdipine 30 mg oral tablet, extended release (04-03)      PHYSICAL EXAM:  GENERAL: A&O x3,  in NAD/P,   NECK: No Swelling  CHEST/LUNG: Good air entry, No wheezing  HEART: RRR, No murmurs  ABDOMEN: Soft, Bowel sounds present  EXTREMITIES:  No clubbing, NITA LEE  64y, Female  Allergy: No Known Allergies    Hospital Day: 3d    Patient seen and examined earlier today.     PMH/PSH:  PAST MEDICAL & SURGICAL HISTORY:  HTN (hypertension)    LAST 24-Hr EVENTS:  NO events reported     VITALS:  T(F): 98.9 (04-06-20 @ 07:54), Max: 99.1 (04-06-20 @ 00:47)  HR: 93 (04-06-20 @ 07:54)  BP: 93/52 (04-06-20 @ 09:24) (76/43 - 96/53)  RR: 20 (04-06-20 @ 09:24)  SpO2: 94% (04-06-20 @ 07:54) on NRFM (however not compliant with mask positioning on face)    TESTS & MEASUREMENTS:                          13.2   11.32 )-----------( 293      ( 06 Apr 2020 06:40 )             38.2       04-06    133<L>  |  95<L>  |  25<H>  ----------------------------<  78  4.5   |  23  |  1.3    Ca    8.4<L>      06 Apr 2020 06:40  Mg     2.2     04-06    TPro  6.7  /  Alb  3.3<L>  /  TBili  0.4  /  DBili  x   /  AST  61<H>  /  ALT  30  /  AlkPhos  97  04-06    LIVER FUNCTIONS - ( 06 Apr 2020 06:40 )  Alb: 3.3 g/dL / Pro: 6.7 g/dL / ALK PHOS: 97 U/L / ALT: 30 U/L / AST: 61 U/L / GGT: x           Procalcitonin, Serum: 0.14 ng/mL (04-03-20 @ 16:52)  D-Dimer Assay, Quantitative: 262 ng/mL DDU (04-05-20 @ 07:13)      Serum Pro-Brain Natriuretic Peptide: 115 pg/mL (04-03-20 @ 19:34)  COVID-19 PCR: Detected (04-03-20 @ 19:00)      RADIOLOGY, ECG, & ADDITIONAL TESTS:  Bilateral opacities unchanged. No pleural effusion or air leak  < from: 12 Lead ECG (04.03.20 @ 18:28) >  QTC Calculation(Bezet) 460 ms        MEDICATIONS:  MEDICATIONS  (STANDING):  azithromycin   Tablet 250 milliGRAM(s) Oral daily  enoxaparin Injectable 40 milliGRAM(s) SubCutaneous at bedtime  hydroxychloroquine   Oral   hydroxychloroquine 200 milliGRAM(s) Oral every 12 hours  metoprolol succinate ER 25 milliGRAM(s) Oral daily      HOME MEDICATIONS:  metoprolol succinate 25 mg oral tablet, extended release (04-03)  NIFEdipine 30 mg oral tablet, extended release (04-03)      PHYSICAL EXAM:  GENERAL: A&O x3,  in NAD/P,   NECK: No Swelling  CHEST/LUNG: Good air entry, No wheezing  HEART: RRR, No murmurs  ABDOMEN: Soft, Bowel sounds present

## 2020-04-06 NOTE — PROGRESS NOTE ADULT - ASSESSMENT
·	Acute Hypoxic Respiratory Failure  ·	COVID19 pneumonia ·	Acute Hypoxic Respiratory Failure; improving progressively. Remains with SpO2 <88% at rest on RA   ·	COVID19 pneumonia on empiric therapy   ·	Weakness  ·	HTN on therapy     PLAN  ·	Titrate O2 delivery down to keep SpO2 around 88%-90%.   ·	Continue with Hydroxychloroquine and Azithromycin   ·	Ambulate   ·	DVT prophylaxis with Low-Molecular Weight Heparin (Lovenox)     Progress Note Handoff  Pending:  ability to ambulate on RA or 2-3 L NC   Patient/Family discussion: medical team left a VM; will reattempt tomorrow  Disposition: Home

## 2020-04-07 LAB
ALBUMIN SERPL ELPH-MCNC: 3.2 G/DL — LOW (ref 3.5–5.2)
ALP SERPL-CCNC: 111 U/L — SIGNIFICANT CHANGE UP (ref 30–115)
ALT FLD-CCNC: 29 U/L — SIGNIFICANT CHANGE UP (ref 0–41)
ANION GAP SERPL CALC-SCNC: 13 MMOL/L — SIGNIFICANT CHANGE UP (ref 7–14)
AST SERPL-CCNC: 57 U/L — HIGH (ref 0–41)
BASOPHILS # BLD AUTO: 0.01 K/UL — SIGNIFICANT CHANGE UP (ref 0–0.2)
BASOPHILS NFR BLD AUTO: 0.1 % — SIGNIFICANT CHANGE UP (ref 0–1)
BILIRUB SERPL-MCNC: 0.4 MG/DL — SIGNIFICANT CHANGE UP (ref 0.2–1.2)
BUN SERPL-MCNC: 15 MG/DL — SIGNIFICANT CHANGE UP (ref 10–20)
CALCIUM SERPL-MCNC: 8.2 MG/DL — LOW (ref 8.5–10.1)
CHLORIDE SERPL-SCNC: 101 MMOL/L — SIGNIFICANT CHANGE UP (ref 98–110)
CO2 SERPL-SCNC: 25 MMOL/L — SIGNIFICANT CHANGE UP (ref 17–32)
CREAT SERPL-MCNC: 0.8 MG/DL — SIGNIFICANT CHANGE UP (ref 0.7–1.5)
CRP SERPL-MCNC: 29.95 MG/DL — HIGH (ref 0–0.4)
EOSINOPHIL # BLD AUTO: 0.01 K/UL — SIGNIFICANT CHANGE UP (ref 0–0.7)
EOSINOPHIL NFR BLD AUTO: 0.1 % — SIGNIFICANT CHANGE UP (ref 0–8)
FERRITIN SERPL-MCNC: 1108 NG/ML — HIGH (ref 15–150)
GLUCOSE SERPL-MCNC: 90 MG/DL — SIGNIFICANT CHANGE UP (ref 70–99)
HCT VFR BLD CALC: 37.7 % — SIGNIFICANT CHANGE UP (ref 37–47)
HGB BLD-MCNC: 12.5 G/DL — SIGNIFICANT CHANGE UP (ref 12–16)
IMM GRANULOCYTES NFR BLD AUTO: 0.9 % — HIGH (ref 0.1–0.3)
LYMPHOCYTES # BLD AUTO: 0.57 K/UL — LOW (ref 1.2–3.4)
LYMPHOCYTES # BLD AUTO: 6.1 % — LOW (ref 20.5–51.1)
MAGNESIUM SERPL-MCNC: 2.1 MG/DL — SIGNIFICANT CHANGE UP (ref 1.8–2.4)
MCHC RBC-ENTMCNC: 28.6 PG — SIGNIFICANT CHANGE UP (ref 27–31)
MCHC RBC-ENTMCNC: 33.2 G/DL — SIGNIFICANT CHANGE UP (ref 32–37)
MCV RBC AUTO: 86.3 FL — SIGNIFICANT CHANGE UP (ref 81–99)
MONOCYTES # BLD AUTO: 0.53 K/UL — SIGNIFICANT CHANGE UP (ref 0.1–0.6)
MONOCYTES NFR BLD AUTO: 5.7 % — SIGNIFICANT CHANGE UP (ref 1.7–9.3)
NEUTROPHILS # BLD AUTO: 8.18 K/UL — HIGH (ref 1.4–6.5)
NEUTROPHILS NFR BLD AUTO: 87.1 % — HIGH (ref 42.2–75.2)
NRBC # BLD: 0 /100 WBCS — SIGNIFICANT CHANGE UP (ref 0–0)
PLATELET # BLD AUTO: 338 K/UL — SIGNIFICANT CHANGE UP (ref 130–400)
POTASSIUM SERPL-MCNC: 4.5 MMOL/L — SIGNIFICANT CHANGE UP (ref 3.5–5)
POTASSIUM SERPL-SCNC: 4.5 MMOL/L — SIGNIFICANT CHANGE UP (ref 3.5–5)
PROCALCITONIN SERPL-MCNC: 0.36 NG/ML — HIGH (ref 0.02–0.1)
PROT SERPL-MCNC: 6.4 G/DL — SIGNIFICANT CHANGE UP (ref 6–8)
RBC # BLD: 4.37 M/UL — SIGNIFICANT CHANGE UP (ref 4.2–5.4)
RBC # FLD: 12.3 % — SIGNIFICANT CHANGE UP (ref 11.5–14.5)
SODIUM SERPL-SCNC: 139 MMOL/L — SIGNIFICANT CHANGE UP (ref 135–146)
WBC # BLD: 9.38 K/UL — SIGNIFICANT CHANGE UP (ref 4.8–10.8)
WBC # FLD AUTO: 9.38 K/UL — SIGNIFICANT CHANGE UP (ref 4.8–10.8)

## 2020-04-07 PROCEDURE — 71045 X-RAY EXAM CHEST 1 VIEW: CPT | Mod: 26

## 2020-04-07 PROCEDURE — 93010 ELECTROCARDIOGRAM REPORT: CPT

## 2020-04-07 PROCEDURE — 99232 SBSQ HOSP IP/OBS MODERATE 35: CPT

## 2020-04-07 RX ADMIN — AZITHROMYCIN 250 MILLIGRAM(S): 500 TABLET, FILM COATED ORAL at 11:09

## 2020-04-07 RX ADMIN — Medication 200 MILLIGRAM(S): at 11:09

## 2020-04-07 RX ADMIN — ENOXAPARIN SODIUM 40 MILLIGRAM(S): 100 INJECTION SUBCUTANEOUS at 22:14

## 2020-04-07 RX ADMIN — Medication 200 MILLIGRAM(S): at 22:14

## 2020-04-07 NOTE — PROGRESS NOTE ADULT - ASSESSMENT
· ·	Acute Hypoxic Respiratory Failure; improving progressively. Remains with SpO2 <88% at rest on RA and wasn't able to tolerate a down-titration trial today to NC  ·	COVID19 pneumonia on empiric therapy   ·	Weakness  ·	HTN on therapy     PLAN  ·	Titrate O2 delivery down to keep SpO2 around 88%-90%.   ·	Continue with Hydroxychloroquine and Azithromycin   ·	Ambulate   ·	DVT prophylaxis with Low-Molecular Weight Heparin (Lovenox)     Progress Note Handoff  Pending:  ability to ambulate on RA or 2-3 L NC; can be transferred to [facility] once able to be on 3-4L NC at rest.   Patient: Patient asked if she's going to be improving soon; she's made aware of her oxygen status and overall illness.   Disposition: Home

## 2020-04-07 NOTE — PROGRESS NOTE ADULT - SUBJECTIVE AND OBJECTIVE BOX
I made rounds today with the treatment team including the hospitalist, residents,  nurses and  and discussed the patient's current medical status and discharge  planning needs. in addition I reviewed  the chart as well as laboratoory  data.    T(C): 37.4 (04-07-20 @ 16:00), Max: 37.9 (04-06-20 @ 18:21)  HR: 113 (04-07-20 @ 12:25) (90 - 113)  BP: 119/60 (04-07-20 @ 16:00) (96/60 - 119/60)  RR: 18 (04-07-20 @ 15:54) (18 - 22)  SpO2: 91% (04-07-20 @ 15:54) (86% - 92%)           I reached out to the patient's health care proxy/ responsible family member-           [  x   ]  I reached  odilon guerrero  son at [phone]  and discussed the patient's medical condition,                   family concerns, and discharge planning           [     ]  I left a message with family to call me back when available    The following was discussed: that pt is gradually improving is still on a mask and we are trying to decrease the oxygen as much as she tolerates  [id number]          [     ] I spent 5-10 minutes on the above discussing medical issues with team members and family    [     ] I spent 11-20 minutes on the above discussing medical issues with team members and family    [ x    ] I spent 21-30 minutes on the above discussing medical issues with team members and family

## 2020-04-07 NOTE — PROGRESS NOTE ADULT - SUBJECTIVE AND OBJECTIVE BOX
T H I S   I S    N O  T   A    F I N A L I Z E D   N O T NITA TOWNSEND  64y, Female  Allergy: No Known Allergies    Hospital Day: 4d    Patient seen and examined earlier today.    services used    LAST 24-Hr EVENTS:  No events     VITALS:  T(F): 98.6 (04-07-20 @ 12:25), Max: 100.3 (04-06-20 @ 18:21)  HR: 113 (04-07-20 @ 12:25)  BP: 108/55 (04-07-20 @ 12:25) (96/60 - 109/56)  RR: 18 (04-07-20 @ 13:51)  SpO2: 91% (04-07-20 @ 13:51) on NRFM    TESTS & MEASUREMENTS:      04-07-20 @ 07:01  -  04-07-20 @ 14:34  --------------------------------------------------------  IN: 450 mL / OUT: 701 mL / NET: -251 mL                            12.5   9.38  )-----------( 338      ( 07 Apr 2020 06:49 )             37.7       04-07    139  |  101  |  15  ----------------------------<  90  4.5   |  25  |  0.8    Ca    8.2<L>      07 Apr 2020 06:49  Mg     2.1     04-07    TPro  6.4  /  Alb  3.2<L>  /  TBili  0.4  /  DBili  x   /  AST  57<H>  /  ALT  29  /  AlkPhos  111  04-07    LIVER FUNCTIONS - ( 07 Apr 2020 06:49 )  Alb: 3.2 g/dL / Pro: 6.4 g/dL / ALK PHOS: 111 U/L / ALT: 29 U/L / AST: 57 U/L / GGT: x             Procalcitonin, Serum: 0.36 ng/mL (04-06-20 @ 16:07)  Procalcitonin, Serum: 0.22 ng/mL (04-05-20 @ 07:13)  Procalcitonin, Serum: 0.14 ng/mL (04-03-20 @ 16:52)    D-Dimer Assay, Quantitative: 262 ng/mL DDU (04-05-20 @ 07:13)    Ferritin, Serum: 1108 ng/mL (04-06-20 @ 16:07)    Serum Pro-Brain Natriuretic Peptide: 115 pg/mL (04-03-20 @ 19:34)    COVID-19 PCR: Detected (04-03-20 @ 19:00)      RADIOLOGY, ECG, & ADDITIONAL TESTS:  12 Lead ECG:   Ventricular Rate 110 BPM    Atrial Rate 110 BPM    P-R Interval 162 ms    QRS Duration 88 ms    Q-T Interval 344 ms    QTC Calculation(Bezet) 465 ms    P Axis 55 degrees    R Axis 42 degrees    T Axis 42 degrees    Diagnosis Line Sinus tachycardia  Otherwise normal ECG    Confirmed by CINDA MANCILLA MD [id number]) on 4/7/2020 9:11:12 AM (04-07-20 @ 08:19)      MEDICATIONS:  MEDICATIONS  (STANDING):  azithromycin   Tablet 250 milliGRAM(s) Oral daily  enoxaparin Injectable 40 milliGRAM(s) SubCutaneous at bedtime  hydroxychloroquine   Oral   hydroxychloroquine 200 milliGRAM(s) Oral every 12 hours  metoprolol succinate ER 25 milliGRAM(s) Oral daily    MEDICATIONS  (PRN):  acetaminophen   Tablet .. 650 milliGRAM(s) Oral every 6 hours PRN Temp greater or equal to 38C (100.4F)      HOME MEDICATIONS:  metoprolol succinate 25 mg oral tablet, extended release (04-03)  NIFEdipine 30 mg oral tablet, extended release (04-03)      PHYSICAL EXAM:  GENERAL: A&O x3,  in NAD/P, on NRFM (adherent to use)  NECK: No Swelling  CHEST/LUNG: Good air entry, No wheezing  HEART: RRR, No murmurs  ABDOMEN: Soft, Bowel sounds present JESUSNITA  64y, Female  Allergy: No Known Allergies    Hospital Day: 4d    Patient seen and examined earlier today.    services used    LAST 24-Hr EVENTS:  No events     VITALS:  T(F): 98.6 (04-07-20 @ 12:25), Max: 100.3 (04-06-20 @ 18:21)  HR: 113 (04-07-20 @ 12:25)  BP: 108/55 (04-07-20 @ 12:25) (96/60 - 109/56)  RR: 18 (04-07-20 @ 13:51)  SpO2: 91% (04-07-20 @ 13:51) on NRFM    TESTS & MEASUREMENTS:      04-07-20 @ 07:01  -  04-07-20 @ 14:34  --------------------------------------------------------  IN: 450 mL / OUT: 701 mL / NET: -251 mL                            12.5   9.38  )-----------( 338      ( 07 Apr 2020 06:49 )             37.7       04-07    139  |  101  |  15  ----------------------------<  90  4.5   |  25  |  0.8    Ca    8.2<L>      07 Apr 2020 06:49  Mg     2.1     04-07    TPro  6.4  /  Alb  3.2<L>  /  TBili  0.4  /  DBili  x   /  AST  57<H>  /  ALT  29  /  AlkPhos  111  04-07    LIVER FUNCTIONS - ( 07 Apr 2020 06:49 )  Alb: 3.2 g/dL / Pro: 6.4 g/dL / ALK PHOS: 111 U/L / ALT: 29 U/L / AST: 57 U/L / GGT: x             Procalcitonin, Serum: 0.36 ng/mL (04-06-20 @ 16:07)  Procalcitonin, Serum: 0.22 ng/mL (04-05-20 @ 07:13)  Procalcitonin, Serum: 0.14 ng/mL (04-03-20 @ 16:52)    D-Dimer Assay, Quantitative: 262 ng/mL DDU (04-05-20 @ 07:13)    Ferritin, Serum: 1108 ng/mL (04-06-20 @ 16:07)    Serum Pro-Brain Natriuretic Peptide: 115 pg/mL (04-03-20 @ 19:34)    COVID-19 PCR: Detected (04-03-20 @ 19:00)      RADIOLOGY, ECG, & ADDITIONAL TESTS:  12 Lead ECG:   Ventricular Rate 110 BPM    Atrial Rate 110 BPM    P-R Interval 162 ms    QRS Duration 88 ms    Q-T Interval 344 ms    QTC Calculation(Bezet) 465 ms    P Axis 55 degrees    R Axis 42 degrees    T Axis 42 degrees    Diagnosis Line Sinus tachycardia  Otherwise normal ECG    Confirmed by CINDA MANCILLA MD ([id number]) on 4/7/2020 9:11:12 AM (04-07-20 @ 08:19)      MEDICATIONS:  MEDICATIONS  (STANDING):  azithromycin   Tablet 250 milliGRAM(s) Oral daily  enoxaparin Injectable 40 milliGRAM(s) SubCutaneous at bedtime  hydroxychloroquine   Oral   hydroxychloroquine 200 milliGRAM(s) Oral every 12 hours  metoprolol succinate ER 25 milliGRAM(s) Oral daily    MEDICATIONS  (PRN):  acetaminophen   Tablet .. 650 milliGRAM(s) Oral every 6 hours PRN Temp greater or equal to 38C (100.4F)      HOME MEDICATIONS:  metoprolol succinate 25 mg oral tablet, extended release (04-03)  NIFEdipine 30 mg oral tablet, extended release (04-03)      PHYSICAL EXAM:  GENERAL: A&O x3,  in NAD/P, on NRFM (adherent to use)  NECK: No Swelling  CHEST/LUNG: Good air entry, No wheezing  HEART: RRR, No murmurs  ABDOMEN: Soft, Bowel sounds present

## 2020-04-07 NOTE — PROGRESS NOTE ADULT - SUBJECTIVE AND OBJECTIVE BOX
ALEYDANITA FORD  64y, Female  Allergy: No Known Allergies    Hospital Day: 5    Patient seen and examined  today.     PMH/PSH:  PAST MEDICAL & SURGICAL HISTORY:  HTN (hypertension)    LAST 24-Hr EVENTS:  NO events reported     VITALS:  Vital Signs Last 24 Hrs  T(C): 37.3 (07 Apr 2020 08:05), Max: 37.9 (06 Apr 2020 18:21)  T(F): 99.2 (07 Apr 2020 08:05), Max: 100.3 (06 Apr 2020 18:21)  HR: 102 (07 Apr 2020 08:05) (90 - 102)  BP: 107/59 (07 Apr 2020 08:05) (96/60 - 109/56)  BP(mean): --  RR: 18 (07 Apr 2020 10:20) (18 - 22)  SpO2: 90% (07 Apr 2020 10:20) (86% - 92%)  T(F): 98.9 (04-06-20 @ 07:54), Max: 99.1 (04-06-20 @ 00:47)  HR: 93 (04-06-20 @ 07:54)  BP: 93/52 (04-06-20 @ 09:24) (76/43 - 96/53)  RR: 20 (04-06-20 @ 09:24)  SpO2: 92% on 100%NRB ( 10L)         CBC Full  -  ( 07 Apr 2020 06:49 )  WBC Count : 9.38 K/uL  RBC Count : 4.37 M/uL  Hemoglobin : 12.5 g/dL  Hematocrit : 37.7 %  Platelet Count - Automated : 338 K/uL  Mean Cell Volume : 86.3 fL  Mean Cell Hemoglobin : 28.6 pg  Mean Cell Hemoglobin Concentration : 33.2 g/dL  Auto Neutrophil # : 8.18 K/uL  Auto Lymphocyte # : 0.57 K/uL  Auto Monocyte # : 0.53 K/uL  Auto Eosinophil # : 0.01 K/uL  Auto Basophil # : 0.01 K/uL  Auto Neutrophil % : 87.1 %  Auto Lymphocyte % : 6.1 %  Auto Monocyte % : 5.7 %  Auto Eosinophil % : 0.1 %  Auto Basophil % : 0.1 %   04-07    139  |  101  |  15  ----------------------------<  90  4.5   |  25  |  0.8    Ca    8.2<L>      07 Apr 2020 06:49  Mg     2.1     04-07  LIVER FUNCTIONS - ( 07 Apr 2020 06:49 )  Alb: 3.2 g/dL / Pro: 6.4 g/dL / ALK PHOS: 111 U/L / ALT: 29 U/L / AST: 57 U/L / GGT: x           TPro  6.4  /  Alb  3.2<L>  /  TBili  0.4  /  DBili  x   /  AST  57<H>  /  ALT  29  /  AlkPhos  111  04-07        Procalcitonin, Serum: 0.14 ng/mL (04-03-20 @ 16:52)  D-Dimer Assay, Quantitative: 262 ng/mL DDU (04-05-20 @ 07:13)  Serum Pro-Brain Natriuretic Peptide: 115 pg/mL (04-03-20 @ 19:34)  COVID-19 PCR: Detected (04-03-20 @ 19:00)      RADIOLOGY, ECG, & ADDITIONAL TESTS:  Bilateral opacities unchanged. No pleural effusion or air leak  < from: 12 Lead ECG (04.03.20 @ 18:28) >  QTC Calculation(Bezet) 465 ms ( 04/7/2020)        MEDICATIONS:  MEDICATIONS  (STANDING):  azithromycin   Tablet 250 milliGRAM(s) Oral daily  enoxaparin Injectable 40 milliGRAM(s) SubCutaneous at bedtime  hydroxychloroquine   Oral   hydroxychloroquine 200 milliGRAM(s) Oral every 12 hours  metoprolol succinate ER 25 milliGRAM(s) Oral daily      HOME MEDICATIONS:  metoprolol succinate 25 mg oral tablet, extended release (04-03)  NIFEdipine 30 mg oral tablet, extended release (04-03)      PHYSICAL EXAM:  GENERAL: A&O x3,  in NAD/P,   NECK: No Swelling  CHEST/LUNG: Good air entry, No wheezing  HEART: RRR, No murmurs  ABDOMEN: Soft, Bowel sounds present   : VOIDING  SKIN: Warm dry and low grade fever.       Physical Exam:    Reference Recent Physical Exam:  · In accordance with current standards limiting patient contact please refer to the recent:	Inpatient Physical Exam	    Assessment and Plan:   · Assessment		  ·	Acute Hypoxic Respiratory Failure; improving progressively. Remains with SpO2 92% on 100 BRB   ·	COVID19 pneumonia on empiric therapy   ·	Weakness  ·	HTN on therapy     PLAN  ·	Titrate O2 delivery down to keep SpO2 around 88%-90%.   ·	Continue with Hydroxychloroquine and Azithromycin   ·	Ambulate   ·	C/U DVT prophylaxis with Low-Molecular Weight Heparin (Lovenox)     Progress Note Handoff  Pending:  ability to ambulate on RA or 2-3 L NC   Patient/Family discussion:   Disposition: Home

## 2020-04-08 LAB
ALBUMIN SERPL ELPH-MCNC: 3.1 G/DL — LOW (ref 3.5–5.2)
ALP SERPL-CCNC: 142 U/L — HIGH (ref 30–115)
ALT FLD-CCNC: 30 U/L — SIGNIFICANT CHANGE UP (ref 0–41)
ANION GAP SERPL CALC-SCNC: 18 MMOL/L — HIGH (ref 7–14)
AST SERPL-CCNC: 49 U/L — HIGH (ref 0–41)
BASOPHILS # BLD AUTO: 0.02 K/UL — SIGNIFICANT CHANGE UP (ref 0–0.2)
BASOPHILS NFR BLD AUTO: 0.2 % — SIGNIFICANT CHANGE UP (ref 0–1)
BILIRUB SERPL-MCNC: 0.5 MG/DL — SIGNIFICANT CHANGE UP (ref 0.2–1.2)
BUN SERPL-MCNC: 12 MG/DL — SIGNIFICANT CHANGE UP (ref 10–20)
CALCIUM SERPL-MCNC: 8.6 MG/DL — SIGNIFICANT CHANGE UP (ref 8.5–10.1)
CHLORIDE SERPL-SCNC: 101 MMOL/L — SIGNIFICANT CHANGE UP (ref 98–110)
CO2 SERPL-SCNC: 22 MMOL/L — SIGNIFICANT CHANGE UP (ref 17–32)
CREAT SERPL-MCNC: 0.8 MG/DL — SIGNIFICANT CHANGE UP (ref 0.7–1.5)
CRP SERPL-MCNC: 26.55 MG/DL — HIGH (ref 0–0.4)
CRP SERPL-MCNC: 30.01 MG/DL — HIGH (ref 0–0.4)
D DIMER BLD IA.RAPID-MCNC: 585 NG/ML DDU — HIGH (ref 0–230)
EOSINOPHIL # BLD AUTO: 0.01 K/UL — SIGNIFICANT CHANGE UP (ref 0–0.7)
EOSINOPHIL NFR BLD AUTO: 0.1 % — SIGNIFICANT CHANGE UP (ref 0–8)
FERRITIN SERPL-MCNC: 1264 NG/ML — HIGH (ref 15–150)
FERRITIN SERPL-MCNC: 1307 NG/ML — HIGH (ref 15–150)
GLUCOSE SERPL-MCNC: 79 MG/DL — SIGNIFICANT CHANGE UP (ref 70–99)
HCT VFR BLD CALC: 35.5 % — LOW (ref 37–47)
HGB BLD-MCNC: 12.4 G/DL — SIGNIFICANT CHANGE UP (ref 12–16)
IMM GRANULOCYTES NFR BLD AUTO: 1.1 % — HIGH (ref 0.1–0.3)
LYMPHOCYTES # BLD AUTO: 0.68 K/UL — LOW (ref 1.2–3.4)
LYMPHOCYTES # BLD AUTO: 8.5 % — LOW (ref 20.5–51.1)
MAGNESIUM SERPL-MCNC: 2.1 MG/DL — SIGNIFICANT CHANGE UP (ref 1.8–2.4)
MCHC RBC-ENTMCNC: 30.1 PG — SIGNIFICANT CHANGE UP (ref 27–31)
MCHC RBC-ENTMCNC: 34.9 G/DL — SIGNIFICANT CHANGE UP (ref 32–37)
MCV RBC AUTO: 86.2 FL — SIGNIFICANT CHANGE UP (ref 81–99)
MONOCYTES # BLD AUTO: 0.7 K/UL — HIGH (ref 0.1–0.6)
MONOCYTES NFR BLD AUTO: 8.7 % — SIGNIFICANT CHANGE UP (ref 1.7–9.3)
NEUTROPHILS # BLD AUTO: 6.54 K/UL — HIGH (ref 1.4–6.5)
NEUTROPHILS NFR BLD AUTO: 81.4 % — HIGH (ref 42.2–75.2)
NRBC # BLD: 0 /100 WBCS — SIGNIFICANT CHANGE UP (ref 0–0)
PLATELET # BLD AUTO: 411 K/UL — HIGH (ref 130–400)
POTASSIUM SERPL-MCNC: 4.4 MMOL/L — SIGNIFICANT CHANGE UP (ref 3.5–5)
POTASSIUM SERPL-SCNC: 4.4 MMOL/L — SIGNIFICANT CHANGE UP (ref 3.5–5)
PROCALCITONIN SERPL-MCNC: 0.25 NG/ML — HIGH (ref 0.02–0.1)
PROT SERPL-MCNC: 6.7 G/DL — SIGNIFICANT CHANGE UP (ref 6–8)
RBC # BLD: 4.12 M/UL — LOW (ref 4.2–5.4)
RBC # FLD: 12.7 % — SIGNIFICANT CHANGE UP (ref 11.5–14.5)
SODIUM SERPL-SCNC: 141 MMOL/L — SIGNIFICANT CHANGE UP (ref 135–146)
WBC # BLD: 8.04 K/UL — SIGNIFICANT CHANGE UP (ref 4.8–10.8)
WBC # FLD AUTO: 8.04 K/UL — SIGNIFICANT CHANGE UP (ref 4.8–10.8)

## 2020-04-08 PROCEDURE — 99232 SBSQ HOSP IP/OBS MODERATE 35: CPT

## 2020-04-08 PROCEDURE — 71045 X-RAY EXAM CHEST 1 VIEW: CPT | Mod: 26

## 2020-04-08 RX ADMIN — Medication 25 MILLIGRAM(S): at 07:12

## 2020-04-08 RX ADMIN — Medication 200 MILLIGRAM(S): at 12:03

## 2020-04-08 RX ADMIN — Medication 200 MILLIGRAM(S): at 21:09

## 2020-04-08 RX ADMIN — AZITHROMYCIN 250 MILLIGRAM(S): 500 TABLET, FILM COATED ORAL at 12:04

## 2020-04-08 RX ADMIN — ENOXAPARIN SODIUM 40 MILLIGRAM(S): 100 INJECTION SUBCUTANEOUS at 21:10

## 2020-04-08 NOTE — PROGRESS NOTE ADULT - SUBJECTIVE AND OBJECTIVE BOX
NITA LEE  64y, Female  Allergy: No Known Allergies    Hospital Day: 5d    Patient seen and examined today  LAST 24-Hr EVENTS:  Desaturation with position ( 87%). After change the position pule ox increased to 83%  Patient denies any complaints of cp, sob, palpitations or dysuria. Mild cough remains   P/E  CNS: A&O x 3, Moves all extremities  HEENT: NAD  CVS: S1, S2 No S3, S4  Pul: B/L Diminished breath sounds.   : Abdomen soft and nontender. + BS X4 Quadrant   : voiding  Skin warm, dry and afebrile  Vital Signs Last 24 Hrs  T(C): 36.7 (08 Apr 2020 07:05), Max: 37.4 (07 Apr 2020 16:00)  T(F): 98 (08 Apr 2020 07:05), Max: 99.4 (07 Apr 2020 16:00)  HR: 113 (07 Apr 2020 12:25) (113 - 113)  BP: 124/62 (08 Apr 2020 07:05) (108/55 - 124/70)  BP(mean): 19 (07 Apr 2020 16:00) (19 - 19)  RR: 20 (08 Apr 2020 07:05) (18 - 20)  SpO2: 96% (08 Apr 2020 08:55) (86% - 96%)      TESTS & MEASUREMENTS:    CBC Full  -  ( 08 Apr 2020 07:45 )  WBC Count : 8.04 K/uL  RBC Count : 4.12 M/uL  Hemoglobin : 12.4 g/dL  Hematocrit : 35.5 %  Platelet Count - Automated : 411 K/uL  Mean Cell Volume : 86.2 fL  Mean Cell Hemoglobin : 30.1 pg  Mean Cell Hemoglobin Concentration : 34.9 g/dL  Auto Neutrophil # : 6.54 K/uL  Auto Lymphocyte # : 0.68 K/uL  Auto Monocyte # : 0.70 K/uL  Auto Eosinophil # : 0.01 K/uL  Auto Basophil # : 0.02 K/uL  Auto Neutrophil % : 81.4 %  Auto Lymphocyte % : 8.5 %  Auto Monocyte % : 8.7 %  Auto Eosinophil % : 0.1 %  Auto Basophil % : 0.2 %  04-08    141  |  101  |  12  ----------------------------<  79  4.4   |  22  |  0.8    Ca    8.6      08 Apr 2020 07:45  Mg     2.1     04-08    TPro  6.7  /  Alb  3.1<L>  /  TBili  0.5  /  DBili  x   /  AST  49<H>  /  ALT  30  /  AlkPhos  142<H>  04-08          0LIVER FUNCTIONS - ( 07 Apr 2020 06:49 )  Alb: 3.2 g/dL / Pro: 6.4 g/dL / ALK PHOS: 111 U/L / ALT: 29 U/L / AST: 57 U/L / GGT: x           Procalcitonin, Serum: 0.36 ng/mL (04-06-20 @ 16:07)  Procalcitonin, Serum: 0.22 ng/mL (04-05-20 @ 07:13)  Procalcitonin, Serum: 0.14 ng/mL (04-03-20 @ 16:52)    D-Dimer Assay, Quantitative: 262 ng/mL DDU (04-05-20 @ 07:13)    Ferritin, Serum:  1108 ng/mL (04-06-20 @ 16:07)    Serum Pro-Brain Natriuretic Peptide: 115 pg/mL (04-03-20 @ 19:34)    COVID-19 PCR: Detected (04-03-20 @ 19:00)      RADIOLOGY, ECG, & ADDITIONAL TESTS:  12 Lead ECG:   Ventricular Rate 110 BPM    Atrial Rate 110 BPM    P-R Interval 162 ms    QRS Duration 88 ms    Q-T Interval 344 ms    QTC Calculation(Bezet) 465 ms    P Axis 55 degrees    R Axis 42 degrees    T Axis 42 degrees    Diagnosis Line Sinus tachycardia  Otherwise normal ECG    Confirmed by CINDA MANCILLA MD ([id number]) on 4/7/2020 9:11:12 AM (04-07-20 @ 08:19)      MEDICATIONS  (STANDING):  azithromycin   Tablet 250 milliGRAM(s) Oral daily  enoxaparin Injectable 40 milliGRAM(s) SubCutaneous at bedtime  hydroxychloroquine   Oral   hydroxychloroquine 200 milliGRAM(s) Oral every 12 hours  metoprolol succinate ER 25 milliGRAM(s) Oral daily    MEDICATIONS  (PRN):  acetaminophen   Tablet .. 650 milliGRAM(s) Oral every 6 hours PRN Temp greater or equal to 38C (100.4F)    Assessment and Plan:   · Assessment	  Acute Hypoxic Respiratory Failure; improving progressively. Remains with SpO2 <88% at rest on RA and wasn't able to tolerate a down-titration trial today to NC  COVID19 pneumonia on empiric therapy   Weakness  HTN on therapy     PLAN  Titrate O2 delivery down to keep SpO2 around 88%-90%.    Hydroxychloroquine and Azithromycin will complete the dose today   Ambulate   DVT prophylaxis with Low-Molecular Weight Heparin (Lovenox)   Prone position altast 12 hrs per day   Oxygen decrease to 10L NRB  Progress Note Handoff  Pending:  ability to ambulate on RA or 2-3 L NC; can be transferred to [facility] once able to be on 3-4L NC at rest.   Patient: Patient asked if she's going to be improving soon; she's made aware of her oxygen status and overall illness.   Disposition: Home

## 2020-04-08 NOTE — PROGRESS NOTE ADULT - ASSESSMENT
·	Acute Hypoxic Respiratory Failure; improving progressively but slowly. Remains with SpO2 <88% at rest on RA.  ·	COVID19 pneumonia on empiric therapy   ·	Weakness; encouraged to ambulate in the rooom  ·	HTN on therapy     PLAN  ·	Titrate O2 delivery down to keep SpO2 around 88%-90%.   ·	Continue with Hydroxychloroquine and Azithromycin   ·	Ambulate   ·	DVT prophylaxis with Low-Molecular Weight Heparin (Lovenox)     Progress Note Handoff  Pending:  ability to ambulate on RA or 2-3 L NC.  Family: Communication team reached out to patient's son with updates; all Qs answered   Disposition: Home when able to ambulate on NC and keep SpO2>88%. Can be transferred to [facility] once able to be on 3-4L NC at rest.     Patient remains with poor prognosis overall despite all care. Remains at high risk for Cytokine Release Syndrome; but clinically stable    Discussed with nursing staff and PA assigned.

## 2020-04-08 NOTE — CHART NOTE - NSCHARTNOTEFT_GEN_A_CORE
Communication Team Note:    Contacted patient's son, Tavo Friedman.  Updated regarding patient's current status, treatment, last CXR results.     No questions at this time. Would like someone to provide patient with his number has patient lost it. [phone]

## 2020-04-08 NOTE — PROGRESS NOTE ADULT - SUBJECTIVE AND OBJECTIVE BOX
JESUSNITA  64y, Female  Allergy: No Known Allergies    Hospital Day: 5d    Patient seen and examined earlier today.   [place] translation services used    LAST 24-Hr EVENTS:  No significant improvement in oxygenation status     VITALS:  T(F): 98.5 (04-08-20 @ 11:00), Max: 99.2 (04-07-20 @ 22:14)  HR: 101 (04-08-20 @ 11:00)  BP: 120/60 (04-08-20 @ 11:00) (120/60 - 124/70)  RR: 20 (04-08-20 @ 11:00)  SpO2: 97% (04-08-20 @ 11:00)    TESTS & MEASUREMENTS:      04-07-20 @ 07:01  -  04-08-20 @ 07:00  --------------------------------------------------------  IN: 450 mL / OUT: 701 mL / NET: -251 mL                            12.4   8.04  )-----------( 411      ( 08 Apr 2020 07:45 )             35.5       04-08    141  |  101  |  12  ----------------------------<  79  4.4   |  22  |  0.8    Ca    8.6      08 Apr 2020 07:45  Mg     2.1     04-08    TPro  6.7  /  Alb  3.1<L>  /  TBili  0.5  /  DBili  x   /  AST  49<H>  /  ALT  30  /  AlkPhos  142<H>  04-08    LIVER FUNCTIONS - ( 08 Apr 2020 07:45 )  Alb: 3.1 g/dL / Pro: 6.7 g/dL / ALK PHOS: 142 U/L / ALT: 30 U/L / AST: 49 U/L / GGT: x           Procalcitonin, Serum: 0.25 ng/mL (04-08-20 @ 07:45)  Procalcitonin, Serum: 0.36 ng/mL (04-06-20 @ 16:07)  Procalcitonin, Serum: 0.22 ng/mL (04-05-20 @ 07:13)  Procalcitonin, Serum: 0.14 ng/mL (04-03-20 @ 16:52)    D-Dimer Assay, Quantitative: 585 ng/mL DDU (04-08-20 @ 07:45)  D-Dimer Assay, Quantitative: 262 ng/mL DDU (04-05-20 @ 07:13)    Ferritin, Serum: 1264 ng/mL (04-08-20 @ 07:45)  Ferritin, Serum: 1307 ng/mL (04-07-20 @ 16:00)  Ferritin, Serum: 1108 ng/mL (04-06-20 @ 16:07)    Serum Pro-Brain Natriuretic Peptide: 115 pg/mL (04-03-20 @ 19:34)    COVID-19 PCR: Detected (04-03-20 @ 19:00)      RADIOLOGY, ECG, & ADDITIONAL TESTS:  12 Lead ECG:   Ventricular Rate 110 BPM    Atrial Rate 110 BPM    P-R Interval 162 ms    QRS Duration 88 ms    Q-T Interval 344 ms    QTC Calculation(Bezet) 465 ms    P Axis 55 degrees    R Axis 42 degrees    T Axis 42 degrees    Diagnosis Line Sinus tachycardia  Otherwise normal ECG    Confirmed by CINDA MANCILLA MD ([id number]) on 4/7/2020 9:11:12 AM (04-07-20 @ 08:19)    MEDICATIONS:  MEDICATIONS  (STANDING):  enoxaparin Injectable 40 milliGRAM(s) SubCutaneous at bedtime  hydroxychloroquine   Oral   hydroxychloroquine 200 milliGRAM(s) Oral every 12 hours  metoprolol succinate ER 25 milliGRAM(s) Oral daily    MEDICATIONS  (PRN):  acetaminophen   Tablet .. 650 milliGRAM(s) Oral every 6 hours PRN Temp greater or equal to 38C (100.4F)      HOME MEDICATIONS:  metoprolol succinate 25 mg oral tablet, extended release (04-03)  NIFEdipine 30 mg oral tablet, extended release (04-03)      PHYSICAL EXAM:  GENERAL: A&O x3,  in NAD/P,   NECK: No Swelling  CHEST/LUNG: Good air entry, No wheezing  HEART: RRR, No murmurs  ABDOMEN: Soft, Bowel sounds present JESUSNITA  64y, Female  Allergy: No Known Allergies    Hospital Day: 5d    Patient seen and examined earlier today.   [place] translation services used    LAST 24-Hr EVENTS:  No significant improvement in oxygenation status     VITALS:  T(F): 98.5 (04-08-20 @ 11:00), Max: 99.2 (04-07-20 @ 22:14)  HR: 101 (04-08-20 @ 11:00)  BP: 120/60 (04-08-20 @ 11:00) (120/60 - 124/70)  RR: 20 (04-08-20 @ 11:00)  SpO2: 97% (04-08-20 @ 11:00) on NRFM at 15 L    TESTS & MEASUREMENTS:      04-07-20 @ 07:01  -  04-08-20 @ 07:00  --------------------------------------------------------  IN: 450 mL / OUT: 701 mL / NET: -251 mL                            12.4   8.04  )-----------( 411      ( 08 Apr 2020 07:45 )             35.5       04-08    141  |  101  |  12  ----------------------------<  79  4.4   |  22  |  0.8    Ca    8.6      08 Apr 2020 07:45  Mg     2.1     04-08    TPro  6.7  /  Alb  3.1<L>  /  TBili  0.5  /  DBili  x   /  AST  49<H>  /  ALT  30  /  AlkPhos  142<H>  04-08    LIVER FUNCTIONS - ( 08 Apr 2020 07:45 )  Alb: 3.1 g/dL / Pro: 6.7 g/dL / ALK PHOS: 142 U/L / ALT: 30 U/L / AST: 49 U/L / GGT: x           Procalcitonin, Serum: 0.25 ng/mL (04-08-20 @ 07:45)  Procalcitonin, Serum: 0.36 ng/mL (04-06-20 @ 16:07)  Procalcitonin, Serum: 0.22 ng/mL (04-05-20 @ 07:13)  Procalcitonin, Serum: 0.14 ng/mL (04-03-20 @ 16:52)    D-Dimer Assay, Quantitative: 585 ng/mL DDU (04-08-20 @ 07:45)  D-Dimer Assay, Quantitative: 262 ng/mL DDU (04-05-20 @ 07:13)    Ferritin, Serum: 1264 ng/mL (04-08-20 @ 07:45)  Ferritin, Serum: 1307 ng/mL (04-07-20 @ 16:00)  Ferritin, Serum: 1108 ng/mL (04-06-20 @ 16:07)    Serum Pro-Brain Natriuretic Peptide: 115 pg/mL (04-03-20 @ 19:34)    COVID-19 PCR: Detected (04-03-20 @ 19:00)      RADIOLOGY, ECG, & ADDITIONAL TESTS:  12 Lead ECG:   Ventricular Rate 110 BPM    Atrial Rate 110 BPM    P-R Interval 162 ms    QRS Duration 88 ms    Q-T Interval 344 ms    QTC Calculation(Bezet) 465 ms    P Axis 55 degrees    R Axis 42 degrees    T Axis 42 degrees    Diagnosis Line Sinus tachycardia  Otherwise normal ECG    Confirmed by CINDA MANCILLA MD ([id number]) on 4/7/2020 9:11:12 AM (04-07-20 @ 08:19)    MEDICATIONS:  MEDICATIONS  (STANDING):  enoxaparin Injectable 40 milliGRAM(s) SubCutaneous at bedtime  hydroxychloroquine   Oral   hydroxychloroquine 200 milliGRAM(s) Oral every 12 hours  metoprolol succinate ER 25 milliGRAM(s) Oral daily    MEDICATIONS  (PRN):  acetaminophen   Tablet .. 650 milliGRAM(s) Oral every 6 hours PRN Temp greater or equal to 38C (100.4F)      HOME MEDICATIONS:  metoprolol succinate 25 mg oral tablet, extended release (04-03)  NIFEdipine 30 mg oral tablet, extended release (04-03)      PHYSICAL EXAM:  GENERAL: A&O x3,  in NAD/P,   NECK: No Swelling  CHEST/LUNG: Good air entry, No wheezing  HEART: RRR, No murmurs  ABDOMEN: Soft, Bowel sounds present

## 2020-04-09 PROCEDURE — 71045 X-RAY EXAM CHEST 1 VIEW: CPT | Mod: 26

## 2020-04-09 PROCEDURE — 99233 SBSQ HOSP IP/OBS HIGH 50: CPT

## 2020-04-09 RX ADMIN — Medication 25 MILLIGRAM(S): at 05:15

## 2020-04-09 RX ADMIN — Medication 650 MILLIGRAM(S): at 00:30

## 2020-04-09 RX ADMIN — ENOXAPARIN SODIUM 40 MILLIGRAM(S): 100 INJECTION SUBCUTANEOUS at 21:28

## 2020-04-09 NOTE — CHART NOTE - NSCHARTNOTEFT_GEN_A_CORE
Called by RN that pt desaturated to mid 80's on 8 L NRB during routine vitals. Pt's NRB increased to 13L. PT seen at bedside, no current complaints, no difficulty breathing. PT placed in prone position with improvement in saturation to 92%.    Will recheck vitals. PT aware to stay in prone position.

## 2020-04-09 NOTE — PROGRESS NOTE ADULT - SUBJECTIVE AND OBJECTIVE BOX
T H I S   I S    N O  T   A    F I N A L I Z E D   N O T NITA TOWNSEND  64y, Female  Allergy: No Known Allergies    Hospital Day: 6d    Patient seen and examined earlier today.     PMH/PSH:  PAST MEDICAL & SURGICAL HISTORY:  HTN (hypertension)      LAST 24-Hr EVENTS:    VITALS:  T(F): 97.1 (04-09-20 @ 07:00), Max: 100.6 (04-09-20 @ 00:52)  HR: 98 (04-09-20 @ 07:00)  BP: 118/62 (04-09-20 @ 07:00) (118/62 - 127/75)  RR: 18 (04-09-20 @ 07:00)  SpO2: 97% (04-09-20 @ 07:00) on NRFM 13 L        TESTS & MEASUREMENTS:  Weight (Kg):       04-07-20 @ 07:01  -  04-08-20 @ 07:00  --------------------------------------------------------  IN: 450 mL / OUT: 701 mL / NET: -251 mL                            12.4   8.04  )-----------( 411      ( 08 Apr 2020 07:45 )             35.5       04-08    141  |  101  |  12  ----------------------------<  79  4.4   |  22  |  0.8    Ca    8.6      08 Apr 2020 07:45  Mg     2.1     04-08    TPro  6.7  /  Alb  3.1<L>  /  TBili  0.5  /  DBili  x   /  AST  49<H>  /  ALT  30  /  AlkPhos  142<H>  04-08    LIVER FUNCTIONS - ( 08 Apr 2020 07:45 )  Alb: 3.1 g/dL / Pro: 6.7 g/dL / ALK PHOS: 142 U/L / ALT: 30 U/L / AST: 49 U/L / GGT: x             Procalcitonin, Serum: 0.25 ng/mL (04-08-20 @ 07:45)  Procalcitonin, Serum: 0.36 ng/mL (04-06-20 @ 16:07)  Procalcitonin, Serum: 0.22 ng/mL (04-05-20 @ 07:13)  Procalcitonin, Serum: 0.14 ng/mL (04-03-20 @ 16:52)    D-Dimer Assay, Quantitative: 585 ng/mL DDU (04-08-20 @ 07:45)  D-Dimer Assay, Quantitative: 262 ng/mL DDU (04-05-20 @ 07:13)    Ferritin, Serum: 1264 ng/mL (04-08-20 @ 07:45)  Ferritin, Serum: 1307 ng/mL (04-07-20 @ 16:00)  Ferritin, Serum: 1108 ng/mL (04-06-20 @ 16:07)    Serum Pro-Brain Natriuretic Peptide: 115 pg/mL (04-03-20 @ 19:34)    COVID-19 PCR: Detected (04-03-20 @ 19:00)      MEDICATIONS:  MEDICATIONS  (STANDING):  enoxaparin Injectable 40 milliGRAM(s) SubCutaneous at bedtime  metoprolol succinate ER 25 milliGRAM(s) Oral daily    MEDICATIONS  (PRN):  acetaminophen   Tablet .. 650 milliGRAM(s) Oral every 6 hours PRN Temp greater or equal to 38C (100.4F)      HOME MEDICATIONS:  metoprolol succinate 25 mg oral tablet, extended release (04-03)  NIFEdipine 30 mg oral tablet, extended release (04-03)      PHYSICAL EXAM:  GENERAL: A&O x3,  in NAD/P,   NECK: No Swelling  CHEST/LUNG: Good air entry, No wheezing  HEART: RRR, No murmurs  ABDOMEN: Soft, Bowel sounds present  EXTREMITIES:  No clubbing, JESUSNITA  64y, Female  Allergy: No Known Allergies    Hospital Day: 6d    Patient seen and examined earlier today.   Had an asymptomatic episode of documented hypoxemia overnigh    PMH/PSH:  PAST MEDICAL & SURGICAL HISTORY:  HTN (hypertension)      VITALS:  T(F): 97.1 (04-09-20 @ 07:00), Max: 100.6 (04-09-20 @ 00:52)  HR: 98 (04-09-20 @ 07:00)  BP: 118/62 (04-09-20 @ 07:00) (118/62 - 127/75)  RR: 18 (04-09-20 @ 07:00)  SpO2: 97% (04-09-20 @ 07:00) on NRFM 13 L    TESTS & MEASUREMENTS:    04-07-20 @ 07:01  -  04-08-20 @ 07:00  --------------------------------------------------------  IN: 450 mL / OUT: 701 mL / NET: -251 mL                        12.4   8.04  )-----------( 411      ( 08 Apr 2020 07:45 )             35.5     04-08    141  |  101  |  12  ----------------------------<  79  4.4   |  22  |  0.8    Ca    8.6      08 Apr 2020 07:45  Mg     2.1     04-08    TPro  6.7  /  Alb  3.1<L>  /  TBili  0.5  /  DBili  x   /  AST  49<H>  /  ALT  30  /  AlkPhos  142<H>  04-08    LIVER FUNCTIONS - ( 08 Apr 2020 07:45 )  Alb: 3.1 g/dL / Pro: 6.7 g/dL / ALK PHOS: 142 U/L / ALT: 30 U/L / AST: 49 U/L / GGT: x             Procalcitonin, Serum: 0.25 ng/mL (04-08-20 @ 07:45)  Procalcitonin, Serum: 0.36 ng/mL (04-06-20 @ 16:07)  Procalcitonin, Serum: 0.22 ng/mL (04-05-20 @ 07:13)  Procalcitonin, Serum: 0.14 ng/mL (04-03-20 @ 16:52)    D-Dimer Assay, Quantitative: 585 ng/mL DDU (04-08-20 @ 07:45)  D-Dimer Assay, Quantitative: 262 ng/mL DDU (04-05-20 @ 07:13)    Ferritin, Serum: 1264 ng/mL (04-08-20 @ 07:45)  Ferritin, Serum: 1307 ng/mL (04-07-20 @ 16:00)  Ferritin, Serum: 1108 ng/mL (04-06-20 @ 16:07)    Serum Pro-Brain Natriuretic Peptide: 115 pg/mL (04-03-20 @ 19:34)    COVID-19 PCR: Detected (04-03-20 @ 19:00)    MEDICATIONS:  MEDICATIONS  (STANDING):  enoxaparin Injectable 40 milliGRAM(s) SubCutaneous at bedtime  metoprolol succinate ER 25 milliGRAM(s) Oral daily    MEDICATIONS  (PRN):  acetaminophen   Tablet .. 650 milliGRAM(s) Oral every 6 hours PRN Temp greater or equal to 38C (100.4F)      HOME MEDICATIONS:  metoprolol succinate 25 mg oral tablet, extended release (04-03)  NIFEdipine 30 mg oral tablet, extended release (04-03)      PHYSICAL EXAM:  GENERAL: A&O x3,  in NAD/P,   NECK: No Swelling  CHEST/LUNG: Good air entry, No wheezing  HEART: RRR, No murmurs  ABDOMEN: Soft, Bowel sounds present  EXTREMITIES:  No clubbing, ALYSE LEEMA  64y, Female  Allergy: No Known Allergies    Hospital Day: 6d    Patient seen and examined earlier today.    phone used  Had an asymptomatic episode of documented hypoxemia overnight    PMH/PSH:  PAST MEDICAL & SURGICAL HISTORY:  HTN (hypertension)      VITALS:  T(F): 97.1 (04-09-20 @ 07:00), Max: 100.6 (04-09-20 @ 00:52)  HR: 98 (04-09-20 @ 07:00)  BP: 118/62 (04-09-20 @ 07:00) (118/62 - 127/75)  RR: 18 (04-09-20 @ 07:00)  SpO2: 97% (04-09-20 @ 07:00) on NRFM 13 L    TESTS & MEASUREMENTS:    04-07-20 @ 07:01  -  04-08-20 @ 07:00  --------------------------------------------------------  IN: 450 mL / OUT: 701 mL / NET: -251 mL                        12.4   8.04  )-----------( 411      ( 08 Apr 2020 07:45 )             35.5     04-08    141  |  101  |  12  ----------------------------<  79  4.4   |  22  |  0.8    Ca    8.6      08 Apr 2020 07:45  Mg     2.1     04-08    TPro  6.7  /  Alb  3.1<L>  /  TBili  0.5  /  DBili  x   /  AST  49<H>  /  ALT  30  /  AlkPhos  142<H>  04-08    LIVER FUNCTIONS - ( 08 Apr 2020 07:45 )  Alb: 3.1 g/dL / Pro: 6.7 g/dL / ALK PHOS: 142 U/L / ALT: 30 U/L / AST: 49 U/L / GGT: x             Procalcitonin, Serum: 0.25 ng/mL (04-08-20 @ 07:45)  Procalcitonin, Serum: 0.36 ng/mL (04-06-20 @ 16:07)  Procalcitonin, Serum: 0.22 ng/mL (04-05-20 @ 07:13)  Procalcitonin, Serum: 0.14 ng/mL (04-03-20 @ 16:52)    D-Dimer Assay, Quantitative: 585 ng/mL DDU (04-08-20 @ 07:45)  D-Dimer Assay, Quantitative: 262 ng/mL DDU (04-05-20 @ 07:13)    Ferritin, Serum: 1264 ng/mL (04-08-20 @ 07:45)  Ferritin, Serum: 1307 ng/mL (04-07-20 @ 16:00)  Ferritin, Serum: 1108 ng/mL (04-06-20 @ 16:07)    Serum Pro-Brain Natriuretic Peptide: 115 pg/mL (04-03-20 @ 19:34)    COVID-19 PCR: Detected (04-03-20 @ 19:00)    MEDICATIONS:  MEDICATIONS  (STANDING):  enoxaparin Injectable 40 milliGRAM(s) SubCutaneous at bedtime  metoprolol succinate ER 25 milliGRAM(s) Oral daily    MEDICATIONS  (PRN):  acetaminophen   Tablet .. 650 milliGRAM(s) Oral every 6 hours PRN Temp greater or equal to 38C (100.4F)      HOME MEDICATIONS:  metoprolol succinate 25 mg oral tablet, extended release (04-03)  NIFEdipine 30 mg oral tablet, extended release (04-03)      PHYSICAL EXAM:  GENERAL: A&O x3,  in NAD/P,   NECK: No Swelling  CHEST/LUNG: Good air entry, No wheezing  HEART: RRR, No murmurs  ABDOMEN: Soft, Bowel sounds present

## 2020-04-09 NOTE — PROGRESS NOTE ADULT - SUBJECTIVE AND OBJECTIVE BOX
HD-7    64YR Old female,Anguillan speaking- Alert but disoreinted to time-follows commands- No acute distress noted.On NRM at 8 lit/mt-Encouraged deep breathing exercises. Placed in prone position for about 30-45mts. Decreased O2  to 6lit- Sat98% on 6lit. RR- 26/MT. Will  continue to monitor her resp status.      Vital Signs Last 24 Hrs  T(C): 36.2 (09 Apr 2020 07:00), Max: 38.1 (09 Apr 2020 00:52)  T(F): 97.1 (09 Apr 2020 07:00), Max: 100.6 (09 Apr 2020 00:52)  HR: 98 (09 Apr 2020 07:00) (98 - 108)  BP: 118/62 (09 Apr 2020 07:00) (118/62 - 127/75)  BP(mean): --  RR: 18 (09 Apr 2020 07:00) (18 - 20)  SpO2: 98% (09 Apr 2020 10:15) (88% - 98%)    PHYSICAL EXAM:          MEDICATIONS:  Antibiotics:    Neuro:  acetaminophen   Tablet .. 650 milliGRAM(s) Oral every 6 hours PRN    Anticoagulation:  enoxaparin Injectable 40 milliGRAM(s) SubCutaneous at bedtime    OTHER:  metoprolol succinate ER 25 milliGRAM(s) Oral daily    IVF:        LABS:                        12.4   8.04  )-----------( 411      ( 08 Apr 2020 07:45 )             35.5     04-08    141  |  101  |  12  ----------------------------<  79  4.4   |  22  |  0.8    Ca    8.6      08 Apr 2020 07:45  Mg     2.1     04-08    TPro  6.7  /  Alb  3.1<L>  /  TBili  0.5  /  DBili  x   /  AST  49<H>  /  ALT  30  /  AlkPhos  142<H>  04-08          RADIOLOGY:      Assessment:      Plan: HD-7    64YR Old female,Sammarinese speaking- Alert but disoreinted to time-follows commands- No acute distress noted.On NRM at 8 lit/mt-Encouraged deep breathing exercises. Placed in prone position for about 30-45mts- 2 times. Decreased O2  to 6lit- Sat96 to98% but dips to 86% on 6lit. RR- 26 to 32/MT. Temp 100.4 in PM.  Will  continue to monitor her resp status.      Vital Signs Last 24 Hrs  T(C): 36.2 (09 Apr 2020 07:00), Max: 38.1 (09 Apr 2020 00:52)  T(F): 97.1 (09 Apr 2020 07:00), Max: 100.6 (09 Apr 2020 00:52)  HR: 98 (09 Apr 2020 07:00) (98 - 108)  BP: 118/62 (09 Apr 2020 07:00) (118/62 - 127/75)  BP(mean): --  RR: 18 (09 Apr 2020 07:00) (18 - 20)  SpO2: 98% (09 Apr 2020 10:15) (88% - 98%)    PHYSICAL EXAM:          MEDICATIONS:  Antibiotics:    Neuro:  acetaminophen   Tablet .. 650 milliGRAM(s) Oral every 6 hours PRN    Anticoagulation:  enoxaparin Injectable 40 milliGRAM(s) SubCutaneous at bedtime    OTHER:  metoprolol succinate ER 25 milliGRAM(s) Oral daily    IVF:        LABS:                        12.4   8.04  )-----------( 411      ( 08 Apr 2020 07:45 )             35.5     04-08    141  |  101  |  12  ----------------------------<  79  4.4   |  22  |  0.8    Ca    8.6      08 Apr 2020 07:45  Mg     2.1     04-08    TPro  6.7  /  Alb  3.1<L>  /  TBili  0.5  /  DBili  x   /  AST  49<H>  /  ALT  30  /  AlkPhos  142<H>  04-08          RADIOLOGY:      Assessment:      Plan:

## 2020-04-09 NOTE — CHART NOTE - NSCHARTNOTEFT_GEN_A_CORE
Communication Team Note:    Contacted Elder Vo and updated regarding patient's current status.   Had questions regarding current status and how to tell she is improving. Addressed questions and concerns.

## 2020-04-09 NOTE — PROGRESS NOTE ADULT - ASSESSMENT
COVID PNA    p- Will cont to monito her Resp status COVID PNA    p- Will cont to monitor her Resp status

## 2020-04-09 NOTE — PROGRESS NOTE ADULT - ASSESSMENT
·	Acute Hypoxic Respiratory Failure; Persistent. Remains with SpO2 <88% at rest on RA.  ·	COVID19 pneumonia on empiric therapy   ·	Weakness; encouraged to ambulate in the rooom  ·	HTN on therapy     PLAN  ·	Titrate O2 delivery down to keep SpO2 around 88%-90%. Remains in need for NRFM.   ·	Completed Hydroxychloroquine and Azithromycin   ·	Ambulate   ·	Agreeing to prone position  ·	DVT prophylaxis with Low-Molecular Weight Heparin (Lovenox)     Progress Note Handoff  Pending:  ability to ambulate on RA or 2-3 L NC.  Family: Communication team reached out to patient's son with updates; all Qs answered   Disposition: Home when able to ambulate on NC and keep SpO2>88%. Can be transferred to [facility] once able to be on 3-4L NC at rest.     Communication team discussed with family member (son). Patient overall remains at very high risk for worsening acute resp failure and intubation.

## 2020-04-10 LAB
ALBUMIN SERPL ELPH-MCNC: 2.9 G/DL — LOW (ref 3.5–5.2)
ALP SERPL-CCNC: 202 U/L — HIGH (ref 30–115)
ALT FLD-CCNC: 41 U/L — SIGNIFICANT CHANGE UP (ref 0–41)
ANION GAP SERPL CALC-SCNC: 15 MMOL/L — HIGH (ref 7–14)
AST SERPL-CCNC: 57 U/L — HIGH (ref 0–41)
BILIRUB SERPL-MCNC: 0.6 MG/DL — SIGNIFICANT CHANGE UP (ref 0.2–1.2)
BUN SERPL-MCNC: 14 MG/DL — SIGNIFICANT CHANGE UP (ref 10–20)
CALCIUM SERPL-MCNC: 8.7 MG/DL — SIGNIFICANT CHANGE UP (ref 8.5–10.1)
CHLORIDE SERPL-SCNC: 99 MMOL/L — SIGNIFICANT CHANGE UP (ref 98–110)
CO2 SERPL-SCNC: 22 MMOL/L — SIGNIFICANT CHANGE UP (ref 17–32)
CREAT SERPL-MCNC: 0.7 MG/DL — SIGNIFICANT CHANGE UP (ref 0.7–1.5)
GLUCOSE SERPL-MCNC: 79 MG/DL — SIGNIFICANT CHANGE UP (ref 70–99)
HCT VFR BLD CALC: 37.2 % — SIGNIFICANT CHANGE UP (ref 37–47)
HGB BLD-MCNC: 12.2 G/DL — SIGNIFICANT CHANGE UP (ref 12–16)
MCHC RBC-ENTMCNC: 28.8 PG — SIGNIFICANT CHANGE UP (ref 27–31)
MCHC RBC-ENTMCNC: 32.8 G/DL — SIGNIFICANT CHANGE UP (ref 32–37)
MCV RBC AUTO: 87.7 FL — SIGNIFICANT CHANGE UP (ref 81–99)
NRBC # BLD: 0 /100 WBCS — SIGNIFICANT CHANGE UP (ref 0–0)
PLATELET # BLD AUTO: 533 K/UL — HIGH (ref 130–400)
POTASSIUM SERPL-MCNC: 4.4 MMOL/L — SIGNIFICANT CHANGE UP (ref 3.5–5)
POTASSIUM SERPL-SCNC: 4.4 MMOL/L — SIGNIFICANT CHANGE UP (ref 3.5–5)
PROT SERPL-MCNC: 6.6 G/DL — SIGNIFICANT CHANGE UP (ref 6–8)
RBC # BLD: 4.24 M/UL — SIGNIFICANT CHANGE UP (ref 4.2–5.4)
RBC # FLD: 12.5 % — SIGNIFICANT CHANGE UP (ref 11.5–14.5)
SODIUM SERPL-SCNC: 136 MMOL/L — SIGNIFICANT CHANGE UP (ref 135–146)
WBC # BLD: 9.2 K/UL — SIGNIFICANT CHANGE UP (ref 4.8–10.8)
WBC # FLD AUTO: 9.2 K/UL — SIGNIFICANT CHANGE UP (ref 4.8–10.8)

## 2020-04-10 PROCEDURE — 99232 SBSQ HOSP IP/OBS MODERATE 35: CPT

## 2020-04-10 RX ORDER — HYDROXYCHLOROQUINE SULFATE 200 MG
200 TABLET ORAL
Refills: 0 | Status: DISCONTINUED | OUTPATIENT
Start: 2020-04-10 | End: 2020-04-10

## 2020-04-10 RX ADMIN — ENOXAPARIN SODIUM 40 MILLIGRAM(S): 100 INJECTION SUBCUTANEOUS at 21:30

## 2020-04-10 RX ADMIN — Medication 25 MILLIGRAM(S): at 05:39

## 2020-04-10 NOTE — PROGRESS NOTE ADULT - SUBJECTIVE AND OBJECTIVE BOX
T H I S   I S    N O  T   A    F I N A L I Z E D   N O T NITA TOWNSEND  64y, Female  Allergy: No Known Allergies    Hospital Day: 7d    Patient seen and examined earlier today.   K[place]akh translation services used at bedside    LAST 24-Hr EVENTS:  No events     VITALS:  T(F): 97.1 (04-10-20 @ 15:00), Max: 98.9 (04-10-20 @ 00:25)  HR: 96 (04-10-20 @ 15:00)  BP: 128/68 (04-10-20 @ 15:00) (128/68 - 141/71)  RR: 20 (04-10-20 @ 08:58)  SpO2: 94% (04-10-20 @ 15:00) on NRFM at 10 L        TESTS & MEASUREMENTS:    04-09-20 @ 07:01  -  04-10-20 @ 07:00  --------------------------------------------------------  IN: 120 mL / OUT: 300 mL / NET: -180 mL                            12.2   9.20  )-----------( 533      ( 10 Apr 2020 06:35 )             37.2       04-10    136  |  99  |  14  ----------------------------<  79  4.4   |  22  |  0.7    Ca    8.7      10 Apr 2020 06:35    TPro  6.6  /  Alb  2.9<L>  /  TBili  0.6  /  DBili  x   /  AST  57<H>  /  ALT  41  /  AlkPhos  202<H>  04-10    LIVER FUNCTIONS - ( 10 Apr 2020 06:35 )  Alb: 2.9 g/dL / Pro: 6.6 g/dL / ALK PHOS: 202 U/L / ALT: 41 U/L / AST: 57 U/L / GGT: x             Procalcitonin, Serum: 0.25 ng/mL (04-08-20 @ 07:45)  Procalcitonin, Serum: 0.36 ng/mL (04-06-20 @ 16:07)  Procalcitonin, Serum: 0.22 ng/mL (04-05-20 @ 07:13)  Procalcitonin, Serum: 0.14 ng/mL (04-03-20 @ 16:52)    D-Dimer Assay, Quantitative: 585 ng/mL DDU (04-08-20 @ 07:45)  D-Dimer Assay, Quantitative: 262 ng/mL DDU (04-05-20 @ 07:13)    Ferritin, Serum: 1264 ng/mL (04-08-20 @ 07:45)  Ferritin, Serum: 1307 ng/mL (04-07-20 @ 16:00)  Ferritin, Serum: 1108 ng/mL (04-06-20 @ 16:07)    Serum Pro-Brain Natriuretic Peptide: 115 pg/mL (04-03-20 @ 19:34)    COVID-19 PCR: Detected (04-03-20 @ 19:00)      MEDICATIONS:  MEDICATIONS  (STANDING):  enoxaparin Injectable 40 milliGRAM(s) SubCutaneous at bedtime  hydroxychloroquine 200 milliGRAM(s) Oral two times a day  metoprolol succinate ER 25 milliGRAM(s) Oral daily    MEDICATIONS  (PRN):  acetaminophen   Tablet .. 650 milliGRAM(s) Oral every 6 hours PRN Temp greater or equal to 38C (100.4F)      HOME MEDICATIONS:  metoprolol succinate 25 mg oral tablet, extended release (04-03)  NIFEdipine 30 mg oral tablet, extended release (04-03)      PHYSICAL EXAM:  GENERAL: A&O x3,  in NAD/P, using NRFM, sitting in bed.  NECK: No Swelling  CHEST/LUNG: Good air entry, No wheezing  HEART: RRR, No murmurs  ABDOMEN: Soft, Bowel sounds present JESUSNITA  64y, Female  Allergy: No Known Allergies    Hospital Day: 7d    Patient seen and examined earlier today.   Libyan translation services used at bedside    LAST 24-Hr EVENTS:  No events     VITALS:  T(F): 97.1 (04-10-20 @ 15:00), Max: 98.9 (04-10-20 @ 00:25)  HR: 96 (04-10-20 @ 15:00)  BP: 128/68 (04-10-20 @ 15:00) (128/68 - 141/71)  RR: 20 (04-10-20 @ 08:58)  SpO2: 94% (04-10-20 @ 15:00) on NRFM at 10 L        TESTS & MEASUREMENTS:    04-09-20 @ 07:01  -  04-10-20 @ 07:00  --------------------------------------------------------  IN: 120 mL / OUT: 300 mL / NET: -180 mL                            12.2   9.20  )-----------( 533      ( 10 Apr 2020 06:35 )             37.2       04-10    136  |  99  |  14  ----------------------------<  79  4.4   |  22  |  0.7    Ca    8.7      10 Apr 2020 06:35    TPro  6.6  /  Alb  2.9<L>  /  TBili  0.6  /  DBili  x   /  AST  57<H>  /  ALT  41  /  AlkPhos  202<H>  04-10    LIVER FUNCTIONS - ( 10 Apr 2020 06:35 )  Alb: 2.9 g/dL / Pro: 6.6 g/dL / ALK PHOS: 202 U/L / ALT: 41 U/L / AST: 57 U/L / GGT: x             Procalcitonin, Serum: 0.25 ng/mL (04-08-20 @ 07:45)  Procalcitonin, Serum: 0.36 ng/mL (04-06-20 @ 16:07)  Procalcitonin, Serum: 0.22 ng/mL (04-05-20 @ 07:13)  Procalcitonin, Serum: 0.14 ng/mL (04-03-20 @ 16:52)    D-Dimer Assay, Quantitative: 585 ng/mL DDU (04-08-20 @ 07:45)  D-Dimer Assay, Quantitative: 262 ng/mL DDU (04-05-20 @ 07:13)    Ferritin, Serum: 1264 ng/mL (04-08-20 @ 07:45)  Ferritin, Serum: 1307 ng/mL (04-07-20 @ 16:00)  Ferritin, Serum: 1108 ng/mL (04-06-20 @ 16:07)    Serum Pro-Brain Natriuretic Peptide: 115 pg/mL (04-03-20 @ 19:34)    COVID-19 PCR: Detected (04-03-20 @ 19:00)      MEDICATIONS:  MEDICATIONS  (STANDING):  enoxaparin Injectable 40 milliGRAM(s) SubCutaneous at bedtime  hydroxychloroquine 200 milliGRAM(s) Oral two times a day  metoprolol succinate ER 25 milliGRAM(s) Oral daily    MEDICATIONS  (PRN):  acetaminophen   Tablet .. 650 milliGRAM(s) Oral every 6 hours PRN Temp greater or equal to 38C (100.4F)      HOME MEDICATIONS:  metoprolol succinate 25 mg oral tablet, extended release (04-03)  NIFEdipine 30 mg oral tablet, extended release (04-03)      PHYSICAL EXAM:  GENERAL: A&O x3,  in NAD/P, using NRFM, sitting in bed.  NECK: No Swelling  CHEST/LUNG: Good air entry, No wheezing  HEART: RRR, No murmurs  ABDOMEN: Soft, Bowel sounds present

## 2020-04-10 NOTE — PROGRESS NOTE ADULT - SUBJECTIVE AND OBJECTIVE BOX
Community rounds:    I made rounds today with the treatment team including the hospitalist, residents,  nurses and  and discussed the patient's current medical status and discharge  planning needs. in addition I reviewed  the chart as well as laboratoory  data.    T(C): 36 (04-10-20 @ 05:50), Max: 38 (04-09-20 @ 16:15)  HR: 110 (04-10-20 @ 08:58) (102 - 114)  BP: 131/67 (04-10-20 @ 08:58) (131/67 - 141/71)  RR: 20 (04-10-20 @ 08:58) (18 - 22)  SpO2: 93% (04-10-20 @ 14:24) (86% - 93%)           I reached out to the patient's health care proxy/ responsible family member-                  [  x   ]  I left a message with family that ( son odilon [phone])   pt still needs oxygen is being seen by therapy , when tolerates less oxygen maybe transfered to [facility]            [     ] I spent 5-10 minutes on the above discussing medical issues with team members and family    [     ] I spent 11-20 minutes on the above discussing medical issues with team members and family    [  x   ] I spent 21-30 minutes on the above discussing medical issues with team members and family

## 2020-04-10 NOTE — CONSULT NOTE ADULT - SUBJECTIVE AND OBJECTIVE BOX
JESUSNITA  64y, Female  Allergy: No Known Allergies      All historical available data reviewed.    HPI:  64 year old female Sinhala speaking with PMHx of hypertension presents with fevers and cough for 6 days.  Patient reports she has not experienced any shortness of breath. Admits to body aches.      Denies chest pain, diarrhea, n/v, abdominal pain, headache, dizziness. (03 Apr 2020 19:15)    FAMILY HISTORY:    PAST MEDICAL & SURGICAL HISTORY:  HTN (hypertension)        VITALS:  T(F): 96.8, Max: 100.4 (04-09-20 @ 16:15)  HR: 110  BP: 131/67  RR: 20Vital Signs Last 24 Hrs  T(C): 36 (10 Apr 2020 05:50), Max: 38 (09 Apr 2020 16:15)  T(F): 96.8 (10 Apr 2020 05:50), Max: 100.4 (09 Apr 2020 16:15)  HR: 110 (10 Apr 2020 08:58) (96 - 114)  BP: 131/67 (10 Apr 2020 08:58) (130/70 - 141/71)  BP(mean): --  RR: 20 (10 Apr 2020 08:58) (18 - 22)  SpO2: 93% (10 Apr 2020 08:58) (86% - 98%)    TESTS & MEASUREMENTS:                        12.2   9.20  )-----------( 533      ( 10 Apr 2020 06:35 )             37.2     04-10    136  |  99  |  14  ----------------------------<  79  4.4   |  22  |  0.7    Ca    8.7      10 Apr 2020 06:35    TPro  6.6  /  Alb  2.9<L>  /  TBili  0.6  /  DBili  x   /  AST  57<H>  /  ALT  41  /  AlkPhos  202<H>  04-10    LIVER FUNCTIONS - ( 10 Apr 2020 06:35 )  Alb: 2.9 g/dL / Pro: 6.6 g/dL / ALK PHOS: 202 U/L / ALT: 41 U/L / AST: 57 U/L / GGT: x                   RADIOLOGY & ADDITIONAL TESTS:  Personal review of radiological diagnostics performed  Echo and EKG results noted when applicable.     MEDICATIONS:  acetaminophen   Tablet .. 650 milliGRAM(s) Oral every 6 hours PRN  enoxaparin Injectable 40 milliGRAM(s) SubCutaneous at bedtime  hydroxychloroquine 200 milliGRAM(s) Oral two times a day  metoprolol succinate ER 25 milliGRAM(s) Oral daily      ANTIBIOTICS:  hydroxychloroquine 200 milliGRAM(s) Oral two times a day

## 2020-04-10 NOTE — PROGRESS NOTE ADULT - ASSESSMENT
·	Persistent acute Hypoxic Respiratory Failure. Remains with SpO2 <88% at rest on RA.  ·	COVID19 pneumonia on empiric therapy   ·	Weakness; encouraged to ambulate in the room  ·	HTN on therapy , stable    PLAN  ·	Titrate O2 delivery down to keep SpO2 around 88%-90%. Remains in need for NRFM at this  time  ·	Completed Hydroxychloroquine and Azithromycin   ·	Agreeing to prone position  ·	Ambulate   ·	DVT prophylaxis with Low-Molecular Weight Heparin (Lovenox) while inpatient.     Progress Note Handoff  Pending:  ability to ambulate on RA or 2-3 L NC. This is not possible at this time.   Family: Communication team reached out to patient's son with updates; all Qs answered   Disposition: Home when able to ambulate on NC and keep SpO2>88%. Can be transferred to [facility] once able to be on 3-4L NC at rest. However she may also decompensate respiratory wise and require critical care.      Communication team discussed with family member (son). Patient overall remains at very high risk for worsening acute resp failure and intubation. ·	Persistent acute Hypoxic Respiratory Failure. Remains with SpO2 <88% at rest on RA.  ·	COVID19 pneumonia on empiric therapy   ·	Weakness; encouraged to ambulate in the room  ·	HTN on therapy , stable    PLAN  ·	Titrate O2 delivery down to keep SpO2 around 88%-90%. Remains in need for NRFM at this  time  ·	Completed Hydroxychloroquine and Azithromycin . I just discussed with our ID team; NO INDICATION for another course of Hydroxychloroquine   ·	Agreeing to prone position  ·	Ambulate   ·	DVT prophylaxis with Low-Molecular Weight Heparin (Lovenox) while inpatient.     Progress Note Handoff  Pending:  ability to ambulate on RA or 2-3 L NC. This is not possible at this time.   Family: Communication team reached out to patient's son with updates; all Qs answered   Disposition: Home when able to ambulate on NC and keep SpO2>88%. Can be transferred to [facility] once able to be on 3-4L NC at rest. However she may also decompensate respiratory wise and require critical care.      Communication team discussed with family member (son). Patient overall remains at very high risk for worsening acute resp failure and intubation.

## 2020-04-10 NOTE — CONSULT NOTE ADULT - ASSESSMENT
64YR Old female,Libyan speaking- Alert but disoreinted to time-follows commands- No acute distress noted.On NRM at 8 lit/mt-Encouraged deep breathing exercises. Placed in prone position for about 30-45mts- 2 times. Decreased O2  to 6lit- Sat96 to98% but dips to 86% on 6lit. RR- 26 to 32/MT. Temp 100.4 in PM.     IMPRESSION;   COVID 19 with Hypoxemia and CXR with right sided opacities  -elevation of inflammatory markers and 6 days of symptom onset raises the possibility of cytokine release 585  ferritin 1264  CRP 30.01  dDimers 585  procalcitonin 0.25  , not suggestive of a bacterial PNA.     RECOMMENDATIONS;    PLAQUENIL 800 mg PO q24h x one dose, then 400mg x once per day for 4 more days.  -if QTc<500ms then start HCQ. No monitoring or serial EKGs required.  -if QTC>500ms then monitor EKG. Use MCOT. If tele/MCOT no serial EKGs  -d/c for QTc>550   once she's off the NRB is a potential transfer to [facility]

## 2020-04-11 LAB
ALBUMIN SERPL ELPH-MCNC: 3 G/DL — LOW (ref 3.5–5.2)
ALP SERPL-CCNC: 253 U/L — HIGH (ref 30–115)
ALT FLD-CCNC: 50 U/L — HIGH (ref 0–41)
ANION GAP SERPL CALC-SCNC: 15 MMOL/L — HIGH (ref 7–14)
AST SERPL-CCNC: 69 U/L — HIGH (ref 0–41)
BASOPHILS # BLD AUTO: 0.03 K/UL — SIGNIFICANT CHANGE UP (ref 0–0.2)
BASOPHILS NFR BLD AUTO: 0.4 % — SIGNIFICANT CHANGE UP (ref 0–1)
BILIRUB SERPL-MCNC: 0.7 MG/DL — SIGNIFICANT CHANGE UP (ref 0.2–1.2)
BUN SERPL-MCNC: 14 MG/DL — SIGNIFICANT CHANGE UP (ref 10–20)
CALCIUM SERPL-MCNC: 8.7 MG/DL — SIGNIFICANT CHANGE UP (ref 8.5–10.1)
CHLORIDE SERPL-SCNC: 96 MMOL/L — LOW (ref 98–110)
CO2 SERPL-SCNC: 25 MMOL/L — SIGNIFICANT CHANGE UP (ref 17–32)
CREAT SERPL-MCNC: 0.7 MG/DL — SIGNIFICANT CHANGE UP (ref 0.7–1.5)
D DIMER BLD IA.RAPID-MCNC: 1238 NG/ML DDU — HIGH (ref 0–230)
EOSINOPHIL # BLD AUTO: 0.1 K/UL — SIGNIFICANT CHANGE UP (ref 0–0.7)
EOSINOPHIL NFR BLD AUTO: 1.5 % — SIGNIFICANT CHANGE UP (ref 0–8)
ERYTHROCYTE [SEDIMENTATION RATE] IN BLOOD: 72 MM/HR — HIGH (ref 0–20)
GLUCOSE SERPL-MCNC: 150 MG/DL — HIGH (ref 70–99)
HCT VFR BLD CALC: 39.9 % — SIGNIFICANT CHANGE UP (ref 37–47)
HGB BLD-MCNC: 12.8 G/DL — SIGNIFICANT CHANGE UP (ref 12–16)
IMM GRANULOCYTES NFR BLD AUTO: 2.2 % — HIGH (ref 0.1–0.3)
LYMPHOCYTES # BLD AUTO: 0.92 K/UL — LOW (ref 1.2–3.4)
LYMPHOCYTES # BLD AUTO: 13.6 % — LOW (ref 20.5–51.1)
MCHC RBC-ENTMCNC: 28.5 PG — SIGNIFICANT CHANGE UP (ref 27–31)
MCHC RBC-ENTMCNC: 32.1 G/DL — SIGNIFICANT CHANGE UP (ref 32–37)
MCV RBC AUTO: 88.9 FL — SIGNIFICANT CHANGE UP (ref 81–99)
MONOCYTES # BLD AUTO: 0.57 K/UL — SIGNIFICANT CHANGE UP (ref 0.1–0.6)
MONOCYTES NFR BLD AUTO: 8.5 % — SIGNIFICANT CHANGE UP (ref 1.7–9.3)
NEUTROPHILS # BLD AUTO: 4.97 K/UL — SIGNIFICANT CHANGE UP (ref 1.4–6.5)
NEUTROPHILS NFR BLD AUTO: 73.8 % — SIGNIFICANT CHANGE UP (ref 42.2–75.2)
NRBC # BLD: 0 /100 WBCS — SIGNIFICANT CHANGE UP (ref 0–0)
PLATELET # BLD AUTO: 589 K/UL — HIGH (ref 130–400)
POTASSIUM SERPL-MCNC: 4.1 MMOL/L — SIGNIFICANT CHANGE UP (ref 3.5–5)
POTASSIUM SERPL-SCNC: 4.1 MMOL/L — SIGNIFICANT CHANGE UP (ref 3.5–5)
PROT SERPL-MCNC: 6.9 G/DL — SIGNIFICANT CHANGE UP (ref 6–8)
RBC # BLD: 4.49 M/UL — SIGNIFICANT CHANGE UP (ref 4.2–5.4)
RBC # FLD: 12.4 % — SIGNIFICANT CHANGE UP (ref 11.5–14.5)
SODIUM SERPL-SCNC: 136 MMOL/L — SIGNIFICANT CHANGE UP (ref 135–146)
WBC # BLD: 6.74 K/UL — SIGNIFICANT CHANGE UP (ref 4.8–10.8)
WBC # FLD AUTO: 6.74 K/UL — SIGNIFICANT CHANGE UP (ref 4.8–10.8)

## 2020-04-11 PROCEDURE — 99232 SBSQ HOSP IP/OBS MODERATE 35: CPT

## 2020-04-11 RX ADMIN — Medication 25 MILLIGRAM(S): at 05:54

## 2020-04-11 RX ADMIN — Medication 650 MILLIGRAM(S): at 00:32

## 2020-04-11 RX ADMIN — ENOXAPARIN SODIUM 40 MILLIGRAM(S): 100 INJECTION SUBCUTANEOUS at 21:14

## 2020-04-11 NOTE — PROGRESS NOTE ADULT - SUBJECTIVE AND OBJECTIVE BOX
64F with ARF due to covid PNA, hospital day 8    Patient s/e, pt was on NRB 8L, O2 sat 95%, switched to NC 5L, O2 sat 94%    Vital Signs Last 24 Hrs  T(C): 36.2 (11 Apr 2020 07:59), Max: 38.1 (10 Apr 2020 23:22)  T(F): 97.2 (11 Apr 2020 07:59), Max: 100.5 (10 Apr 2020 23:22)  HR: 97 (11 Apr 2020 07:59) (96 - 105)  BP: 130/76 (11 Apr 2020 07:59) (128/68 - 137/77)  BP(mean): --  RR: 18 (11 Apr 2020 07:59) (18 - 18)  SpO2: 95% (11 Apr 2020 07:59) (93% - 96%)    MEDICATIONS  (STANDING):  enoxaparin Injectable 40 milliGRAM(s) SubCutaneous at bedtime  metoprolol succinate ER 25 milliGRAM(s) Oral daily    MEDICATIONS  (PRN):  acetaminophen   Tablet .. 650 milliGRAM(s) Oral every 6 hours PRN Temp greater or equal to 38C (100.4F)        PHYSICAL EXAM:  GENERAL: A&O x3,  in NAD/P, using NRFM, sitting in bed.  NECK: No Swelling  CHEST/LUNG: Good air entry, No wheezing  HEART: RRR, No murmurs  ABDOMEN: Soft, Bowel sounds present    CBC Full  -  ( 10 Apr 2020 06:35 )  WBC Count : 9.20 K/uL  RBC Count : 4.24 M/uL  Hemoglobin : 12.2 g/dL  Hematocrit : 37.2 %  Platelet Count - Automated : 533 K/uL  Mean Cell Volume : 87.7 fL  Mean Cell Hemoglobin : 28.8 pg  Mean Cell Hemoglobin Concentration : 32.8 g/dL  Auto Neutrophil # : x  Auto Lymphocyte # : x  Auto Monocyte # : x  Auto Eosinophil # : x  Auto Basophil # : x  Auto Neutrophil % : x  Auto Lymphocyte % : x  Auto Monocyte % : x  Auto Eosinophil % : x  Auto Basophil % : x      04-10    136  |  99  |  14  ----------------------------<  79  4.4   |  22  |  0.7    Ca    8.7      10 Apr 2020 06:35    TPro  6.6  /  Alb  2.9<L>  /  TBili  0.6  /  DBili  x   /  AST  57<H>  /  ALT  41  /  AlkPhos  202<H>  04-10      Procalcitonin, Serum: 0.25 ng/mL (04-08-20 @ 07:45)  Procalcitonin, Serum: 0.36 ng/mL (04-06-20 @ 16:07)  Procalcitonin, Serum: 0.22 ng/mL (04-05-20 @ 07:13)  Procalcitonin, Serum: 0.14 ng/mL (04-03-20 @ 16:52)    D-Dimer Assay, Quantitative: 585 ng/mL DDU (04-08-20 @ 07:45)  D-Dimer Assay, Quantitative: 262 ng/mL DDU (04-05-20 @ 07:13)    Ferritin, Serum: 1264 ng/mL (04-08-20 @ 07:45)  Ferritin, Serum: 1307 ng/mL (04-07-20 @ 16:00)  Ferritin, Serum: 1108 ng/mL (04-06-20 @ 16:07)    Serum Pro-Brain Natriuretic Peptide: 115 pg/mL (04-03-20 @ 19:34)    COVID-19 PCR: Detected (04-03-20 @ 19:00) 64F with ARF due to covid PNA, hospital day 8    Patient s/e, pt was on NRB 8L, O2 sat 95%, switched to NC 5L, O2 sat 94%    Vital Signs Last 24 Hrs  T(C): 36.2 (11 Apr 2020 07:59), Max: 38.1 (10 Apr 2020 23:22)  T(F): 97.2 (11 Apr 2020 07:59), Max: 100.5 (10 Apr 2020 23:22)  HR: 97 (11 Apr 2020 07:59) (96 - 105)  BP: 130/76 (11 Apr 2020 07:59) (128/68 - 137/77)  BP(mean): --  RR: 18 (11 Apr 2020 07:59) (18 - 18)  SpO2: 95% (11 Apr 2020 07:59) (93% - 96%)    MEDICATIONS  (STANDING):  enoxaparin Injectable 40 milliGRAM(s) SubCutaneous at bedtime  metoprolol succinate ER 25 milliGRAM(s) Oral daily    MEDICATIONS  (PRN):  acetaminophen   Tablet .. 650 milliGRAM(s) Oral every 6 hours PRN Temp greater or equal to 38C (100.4F)        PHYSICAL EXAM:  GENERAL: A&O x3,  in NAD  NECK: No Swelling  CHEST/LUNG: Good air entry, No wheezing  HEART: RRR, No murmurs  ABDOMEN: Soft, NT    CBC Full  -  ( 10 Apr 2020 06:35 )  WBC Count : 9.20 K/uL  RBC Count : 4.24 M/uL  Hemoglobin : 12.2 g/dL  Hematocrit : 37.2 %  Platelet Count - Automated : 533 K/uL  Mean Cell Volume : 87.7 fL  Mean Cell Hemoglobin : 28.8 pg  Mean Cell Hemoglobin Concentration : 32.8 g/dL  Auto Neutrophil # : x  Auto Lymphocyte # : x  Auto Monocyte # : x  Auto Eosinophil # : x  Auto Basophil # : x  Auto Neutrophil % : x  Auto Lymphocyte % : x  Auto Monocyte % : x  Auto Eosinophil % : x  Auto Basophil % : x      04-10    136  |  99  |  14  ----------------------------<  79  4.4   |  22  |  0.7    Ca    8.7      10 Apr 2020 06:35    TPro  6.6  /  Alb  2.9<L>  /  TBili  0.6  /  DBili  x   /  AST  57<H>  /  ALT  41  /  AlkPhos  202<H>  04-10      Procalcitonin, Serum: 0.25 ng/mL (04-08-20 @ 07:45)  Procalcitonin, Serum: 0.36 ng/mL (04-06-20 @ 16:07)  Procalcitonin, Serum: 0.22 ng/mL (04-05-20 @ 07:13)  Procalcitonin, Serum: 0.14 ng/mL (04-03-20 @ 16:52)    D-Dimer Assay, Quantitative: 585 ng/mL DDU (04-08-20 @ 07:45)  D-Dimer Assay, Quantitative: 262 ng/mL DDU (04-05-20 @ 07:13)    Ferritin, Serum: 1264 ng/mL (04-08-20 @ 07:45)  Ferritin, Serum: 1307 ng/mL (04-07-20 @ 16:00)  Ferritin, Serum: 1108 ng/mL (04-06-20 @ 16:07)    Serum Pro-Brain Natriuretic Peptide: 115 pg/mL (04-03-20 @ 19:34)    COVID-19 PCR: Detected (04-03-20 @ 19:00)

## 2020-04-11 NOTE — PROGRESS NOTE ADULT - ATTENDING COMMENTS
I have personally seen and examined this patient.  I have fully participated in the care of this patient.  I have reviewed all pertinent clinical information, including history, physical exam, plan and note.   I have reviewed all pertinent clinical information and reviewed all relevant imaging and diagnostic studies personally. I agree with resident note above and plan of care, edited and corrected where applicable.     I used the translation services at bedside    Patient with improving Acute Hypoxic Respiratory Failure due to COVID19 pneumonia  .. Completed Hydroxychloroquine   .. On trials of NC 5L today down from NRFM; she seems to be improving with proning.   .. HTN on therapy , stable   .. Weak, will need to ambulate     Progress Note Handoff  Pending:  Clinical stability on NC. For DC purposes she needs to be on <4L min    Disposition: If OK (SpO2% ~90%) on 5L NC, she might be a good candidate for [facility] (limit: 6L)                    If on <4 L NC and remains weak to ambulate independently; she might be a good candidate for [facility]    Patient remains with poor prognosis overall despite all care. P L E A S E    L I M I T     B L O O D W O R K    (NO CHANGE IN MANAGEMENT)    I have personally seen and examined this patient.  I have fully participated in the care of this patient.  I have reviewed all pertinent clinical information, including history, physical exam, plan and note.   I have reviewed all pertinent clinical information and reviewed all relevant imaging and diagnostic studies personally. I agree with resident note above and plan of care, edited and corrected where applicable.     I used the translation services at bedside    Patient with improving Acute Hypoxic Respiratory Failure due to COVID19 pneumonia  .. Completed Hydroxychloroquine   .. On trials of NC 5L today down from NRFM; she seems to be improving with proning.   .. HTN on therapy , stable   .. Weak, will need to ambulate   .. Elevated inflammatory markers; at risk for Cytokine Release Syndrome despite all care.   .. May benefit from extended anticoagulant therapy as outpatient (LMWH vs DOAC) for 4 weeks as outpatient (elevated DDimers)    Progress Note Handoff  Pending:  Clinical stability on NC. For DC purposes she needs to be on <4L min    Disposition: If OK (SpO2% ~90%) on 5L NC, she might be a good candidate for [facility] (limit: 6L)                    If on <4 L NC and remains weak to ambulate independently; she might be a good candidate for [facility]    Patient remains with poor prognosis overall despite all care.

## 2020-04-11 NOTE — PROGRESS NOTE ADULT - ASSESSMENT
64 F with acute respiratory failure due to covid PNA. currently on 5L NC , O2 sat 94%    PLAN  ·	Titrate O2 delivery down, monitor O2 saturation  ·	HCQ, azithro completed  ·	pronning  ·	11 am labs    Called gia Elder [phone] with updates 64 F with acute respiratory failure due to covid PNA. currently on 5L NC , O2 sat 94%    PLAN  ·	Titrate O2 delivery down, monitor O2 saturation  ·	HCQ, oli completed  ·	pronning  ·	11 am labs    Called gia Friedman [phone] with updates    P L E A S E    L I M I T     B L O O D W O R K    (NO CHANGE IN MANAGEMENT)

## 2020-04-12 LAB — PROCALCITONIN SERPL-MCNC: 0.2 NG/ML — HIGH (ref 0.02–0.1)

## 2020-04-12 PROCEDURE — 99233 SBSQ HOSP IP/OBS HIGH 50: CPT

## 2020-04-12 RX ORDER — ENOXAPARIN SODIUM 100 MG/ML
40 INJECTION SUBCUTANEOUS AT BEDTIME
Refills: 0 | Status: DISCONTINUED | OUTPATIENT
Start: 2020-04-12 | End: 2020-04-16

## 2020-04-12 RX ADMIN — ENOXAPARIN SODIUM 40 MILLIGRAM(S): 100 INJECTION SUBCUTANEOUS at 23:30

## 2020-04-12 RX ADMIN — Medication 25 MILLIGRAM(S): at 05:26

## 2020-04-12 NOTE — PROGRESS NOTE ADULT - SUBJECTIVE AND OBJECTIVE BOX
ID #[id number]    PT is a 64 y.o Sami speaking female, a/w COVID PNA - seen and examined at bedside. PT doing well, no complaints this AM. PT denies any SOB/diff breathing. Pt ambulating to bathroom, no SOB.    MEDICATIONS  (STANDING):  enoxaparin Injectable 40 milliGRAM(s) SubCutaneous at bedtime  metoprolol succinate ER 25 milliGRAM(s) Oral daily    Vital Signs Last 24 Hrs  T(C): 36.3 (12 Apr 2020 08:13), Max: 37.2 (12 Apr 2020 00:27)  T(F): 97.3 (12 Apr 2020 08:13), Max: 99 (12 Apr 2020 05:39)  HR: 81 (12 Apr 2020 08:13) (81 - 96)  BP: 136/71 (12 Apr 2020 08:13) (134/68 - 144/75)  BP(mean): --  RR: 20 (12 Apr 2020 08:13) (18 - 21)  SpO2: 95% (12 Apr 2020 08:13) (91% - 95%)    GEN: NAD, awake and alert  RESP: no use of accessory muscles  CARDIO: + S1/S2  ABDO: soft/NT    LABS                        12.8   6.74  )-----------( 589      ( 11 Apr 2020 12:11 )             39.9   04-11    136  |  96<L>  |  14  ----------------------------<  150<H>  4.1   |  25  |  0.7    Ca    8.7      11 Apr 2020 12:11    TPro  6.9  /  Alb  3.0<L>  /  TBili  0.7  /  DBili  x   /  AST  69<H>  /  ALT  50<H>  /  AlkPhos  253<H>  04-11    D-Dimer Assay, Quantitative: 1238 ng/mL DDU (04.11.20 @ 12:11)

## 2020-04-12 NOTE — CHART NOTE - NSCHARTNOTEFT_GEN_A_CORE
Spoke to pt via  phone # [phone] regarding transfer to [facility], pt amenable. Understands she will be discharged from there. Transfer order in.

## 2020-04-12 NOTE — PROGRESS NOTE ADULT - ATTENDING COMMENTS
Agree with resident's note, HPI, PE, assessment and plan.  Pt was seen and examined independently.     Pt feels fine and looks comfortable, I was able to titrate down O2 to 3L via NC, O2 sat remains >93.    Physical exam:  NAD, looks comfortable   RESP: Right side fine crackles, CTA on left  CVS: S1S2, RRR  GI: abdomen soft NT, ND  Extremities: no cyanosis, no edema  NEURO: AOx3, no focal deficit    labs reviewed   D-Dimer Assay, Quantitative: 1238 ng/mL DDU (04.11.20 @ 12:11)  Procalcitonin, Serum: 0.20  C-Reactive Protein, Serum: 30.01 mg/dL (04.08.20 @ 07:45)    A/P: # Improving Acute Hypoxic Respiratory Failure due to COVID19 pneumonia  - Completed Hydroxychloroquine   - titrate down O2 as tolerated, currently on 3L NC  - cont proning and incentive spirometry  - OOBTC, ambulate as tolerated,  - PT    # Elevated inflammatory markers, but clinically stable, no need to trend unless pt deteriorates    #HTN - BP well controlled, cont home meds    # Elevated D-dimer - will dc on Xarelto 10 mg for 39 days according to latest recommendations       Pt is clinically stable for transfer to [facility], s/o given to dr. Spence

## 2020-04-12 NOTE — CHART NOTE - NSCHARTNOTEFT_GEN_A_CORE
Patient admitted to [facility] - transfer received  Wolof speaking - translation tablet used #[id number]  Pt is feeling well. No complaints. denies SOB. Denies pain.  no crackles on exam  O2 sat 96% on 3.5 L @ 4:30 PM   lowered O2 to 2 L  will reassess Patient admitted to [facility] - transfer received  Upper sorbian speaking - translation tablet used #[id number]  Pt is feeling well. No complaints. denies SOB. Denies pain.  no crackles on exam  O2 sat 96% on 3.5 L @ 4:30 PM   lowered O2 to 1 L, O2 sat 90%

## 2020-04-13 LAB
ALBUMIN SERPL ELPH-MCNC: 3.1 G/DL — LOW (ref 3.5–5.2)
ALP SERPL-CCNC: 236 U/L — HIGH (ref 30–115)
ALT FLD-CCNC: 44 U/L — HIGH (ref 0–41)
ANION GAP SERPL CALC-SCNC: 15 MMOL/L — HIGH (ref 7–14)
AST SERPL-CCNC: 53 U/L — HIGH (ref 0–41)
BILIRUB SERPL-MCNC: 0.4 MG/DL — SIGNIFICANT CHANGE UP (ref 0.2–1.2)
BUN SERPL-MCNC: 15 MG/DL — SIGNIFICANT CHANGE UP (ref 10–20)
CALCIUM SERPL-MCNC: 8.8 MG/DL — SIGNIFICANT CHANGE UP (ref 8.5–10.1)
CHLORIDE SERPL-SCNC: 99 MMOL/L — SIGNIFICANT CHANGE UP (ref 98–110)
CO2 SERPL-SCNC: 24 MMOL/L — SIGNIFICANT CHANGE UP (ref 17–32)
CREAT SERPL-MCNC: 0.7 MG/DL — SIGNIFICANT CHANGE UP (ref 0.7–1.5)
D DIMER BLD IA.RAPID-MCNC: 2703 NG/ML DDU — HIGH (ref 0–230)
GLUCOSE SERPL-MCNC: 94 MG/DL — SIGNIFICANT CHANGE UP (ref 70–99)
HCT VFR BLD CALC: 37.6 % — SIGNIFICANT CHANGE UP (ref 37–47)
HGB BLD-MCNC: 12.1 G/DL — SIGNIFICANT CHANGE UP (ref 12–16)
MCHC RBC-ENTMCNC: 28.5 PG — SIGNIFICANT CHANGE UP (ref 27–31)
MCHC RBC-ENTMCNC: 32.2 G/DL — SIGNIFICANT CHANGE UP (ref 32–37)
MCV RBC AUTO: 88.5 FL — SIGNIFICANT CHANGE UP (ref 81–99)
NRBC # BLD: 0 /100 WBCS — SIGNIFICANT CHANGE UP (ref 0–0)
PLATELET # BLD AUTO: 570 K/UL — HIGH (ref 130–400)
POTASSIUM SERPL-MCNC: 5 MMOL/L — SIGNIFICANT CHANGE UP (ref 3.5–5)
POTASSIUM SERPL-SCNC: 5 MMOL/L — SIGNIFICANT CHANGE UP (ref 3.5–5)
PROT SERPL-MCNC: 7.2 G/DL — SIGNIFICANT CHANGE UP (ref 6–8)
RBC # BLD: 4.25 M/UL — SIGNIFICANT CHANGE UP (ref 4.2–5.4)
RBC # FLD: 12.7 % — SIGNIFICANT CHANGE UP (ref 11.5–14.5)
SODIUM SERPL-SCNC: 138 MMOL/L — SIGNIFICANT CHANGE UP (ref 135–146)
WBC # BLD: 6.19 K/UL — SIGNIFICANT CHANGE UP (ref 4.8–10.8)
WBC # FLD AUTO: 6.19 K/UL — SIGNIFICANT CHANGE UP (ref 4.8–10.8)

## 2020-04-13 RX ADMIN — ENOXAPARIN SODIUM 40 MILLIGRAM(S): 100 INJECTION SUBCUTANEOUS at 22:30

## 2020-04-13 RX ADMIN — Medication 25 MILLIGRAM(S): at 06:00

## 2020-04-13 NOTE — PROGRESS NOTE ADULT - ASSESSMENT
PT is a 64 y.o [place] speaking female, a/w COVID PNA - stable on nasal  canula    ·	continues to require 02 support 2-3 liters NC, desats to 82 on room air on ambulation, cont to titrate down as tolerated  ·	completed plaquenil/azithro  ·	ddimer elevated -> will repeat in am (4/14/2020 at 4 am)  ·	ambulate as tolerated

## 2020-04-13 NOTE — CHART NOTE - NSCHARTNOTEFT_GEN_A_CORE
Met with patient tonight.  She denies current complaints.  She does not wish for us to contact her family.

## 2020-04-13 NOTE — PROGRESS NOTE ADULT - SUBJECTIVE AND OBJECTIVE BOX
Medicine Progress Note    Patient is a 64y old  Female with h/o HTN, who presents with a chief complaint of fevers cough, was febrile and hypoxic, diagnosed with COVID 19, completed Plaquenil. Remains stable on Nasal canula       SUBJECTIVE / OVERNIGHT EVENTS: No overnight events  Pt seen and examined, denies any complaints.    ADDITIONAL REVIEW OF SYSTEMS:    MEDICATIONS  (STANDING):  enoxaparin Injectable 40 milliGRAM(s) SubCutaneous at bedtime  metoprolol succinate ER 25 milliGRAM(s) Oral daily    MEDICATIONS  (PRN):  acetaminophen   Tablet .. 650 milliGRAM(s) Oral every 6 hours PRN Temp greater or equal to 38C (100.4F)    CAPILLARY BLOOD GLUCOSE    I&O's Summary      PHYSICAL EXAM:  Vital Signs Last 24 Hrs  T(C): 36.8 (13 Apr 2020 12:19), Max: 37.1 (12 Apr 2020 20:19)  T(F): 98.2 (13 Apr 2020 12:19), Max: 98.7 (12 Apr 2020 20:19)  HR: 95 (13 Apr 2020 12:19) (80 - 110)  BP: 138/62 (13 Apr 2020 12:19) (137/64 - 147/70)  BP(mean): --  RR: 19 (13 Apr 2020 12:19) (19 - 22)  SpO2: 91% (13 Apr 2020 12:19) (87% - 95%)  CONSTITUTIONAL: NAD, well-developed, well-groomed  ENMT: Moist oral mucosa, no pharyngeal injection or exudates; normal dentition  RESPIRATORY: Normal respiratory effort; diminished breath sounds bilaterally  CARDIOVASCULAR: Regular rate and rhythm, normal S1 and S2, no murmur/rub/gallop; No lower extremity edema; Peripheral pulses are 2+ bilaterally  ABDOMEN: Nontender to palpation, normoactive bowel sounds, no rebound/guarding; No hepatosplenomegaly  PSYCH: A+O to person, place, and time; affect appropriate  NEUROLOGY: CN 2-12 are intact and symmetric; no gross sensory deficits   SKIN: No rashes; no palpable lesions    LABS:                        12.1   6.19  )-----------( 570      ( 13 Apr 2020 09:50 )             37.6     04-13    138  |  99  |  15  ----------------------------<  94  5.0   |  24  |  0.7    Ca    8.8      13 Apr 2020 09:50    TPro  7.2  /  Alb  3.1<L>  /  TBili  0.4  /  DBili  x   /  AST  53<H>  /  ALT  44<H>  /  AlkPhos  236<H>  04-13              COVID-19 PCR: Detected (03 Apr 2020 19:00)      RADIOLOGY & ADDITIONAL TESTS:  Imaging from Last 24 Hours:    Electrocardiogram/QTc Interval:    COORDINATION OF CARE:  Care Discussed with Consultants/Other Providers:

## 2020-04-14 LAB — D DIMER BLD IA.RAPID-MCNC: 1133 NG/ML DDU — HIGH (ref 0–230)

## 2020-04-14 RX ORDER — METOPROLOL TARTRATE 50 MG
25 TABLET ORAL
Refills: 0 | Status: DISCONTINUED | OUTPATIENT
Start: 2020-04-14 | End: 2020-04-16

## 2020-04-14 RX ORDER — METOPROLOL TARTRATE 50 MG
25 TABLET ORAL
Refills: 0 | Status: DISCONTINUED | OUTPATIENT
Start: 2020-04-14 | End: 2020-04-14

## 2020-04-14 RX ADMIN — Medication 650 MILLIGRAM(S): at 00:41

## 2020-04-14 RX ADMIN — Medication 25 MILLIGRAM(S): at 16:33

## 2020-04-14 RX ADMIN — ENOXAPARIN SODIUM 40 MILLIGRAM(S): 100 INJECTION SUBCUTANEOUS at 22:40

## 2020-04-14 RX ADMIN — Medication 25 MILLIGRAM(S): at 01:30

## 2020-04-14 RX ADMIN — Medication 650 MILLIGRAM(S): at 12:26

## 2020-04-14 NOTE — PROGRESS NOTE ADULT - ASSESSMENT
64 y.o Czech speaking female with PMH of HTN admitted with cough and fever found to be COVID+ with elevated D-Dimer. Currently stable on 2 L NC with 92% O2 saturation.       1. COVID-19   - continues to require 02 support 2 liters NC, desats with ambulation,  - completed plaquenil/azithro  - elevated D-dimer ->  (4/14/2020 2703), will be discharged on Xarelto  - PT/OT to assist with ambulation, will reassess O2 saturation with ambulation   - likely d/c home    2. HTN  - continue current management with Metorprolol    3. DVT ppx  - continue Enoxaparin  - d/c on Xarelto    Patient was seen and examined with medical team and  64 y.o Persian speaking female with PMH of HTN admitted with cough and fever found to be COVID+ with elevated D-Dimer. Currently stable on 2 L NC with 92% O2 saturation.       1. COVID-19   - continues to require 02 support 2 liters NC, desats with ambulation,  - completed plaquenil/azithro  - elevated D-dimer ->  (4/14/2020 2703), will be discharged on Xarelto  - PT/OT to assist with ambulation, will reassess O2 saturation with ambulation   - monitor temp, 100.3 overnight     2. HTN  - continue current management with Metorprolol    3. DVT ppx  - continue Enoxaparin  - d/c on Xarelto    Patient was seen and examined with medical team and

## 2020-04-14 NOTE — PROGRESS NOTE ADULT - SUBJECTIVE AND OBJECTIVE BOX
Medicine Progress Note    Patient is a 65 yo Female with PMH of HTN who presents with a chief complaint of fevers cough (13 Apr 2020 15:44)      SUBJECTIVE / OVERNIGHT EVENTS: No acute overnight events c/o improving cough. Denies any CP, SOB, fever or chills.         MEDICATIONS  (STANDING):  enoxaparin Injectable 40 milliGRAM(s) SubCutaneous at bedtime  metoprolol tartrate 25 milliGRAM(s) Oral two times a day    MEDICATIONS  (PRN):  acetaminophen   Tablet .. 650 milliGRAM(s) Oral every 6 hours PRN Temp greater or equal to 38C (100.4F)        PHYSICAL EXAM:  Vital Signs Last 24 Hrs  T(C): 36.2 (14 Apr 2020 07:21), Max: 37.9 (14 Apr 2020 00:30)  T(F): 97.1 (14 Apr 2020 07:21), Max: 100.3 (14 Apr 2020 00:30)  HR: 88 (14 Apr 2020 07:21) (88 - 108)  BP: 142/67 (14 Apr 2020 07:21) (138/62 - 162/74)  BP(mean): 96 (14 Apr 2020 07:21) (96 - 96)  RR: 18 (14 Apr 2020 07:21) (16 - 19)  SpO2: 95% (14 Apr 2020 07:21) (91% - 97%)  CONSTITUTIONAL: NAD, well-developed, well-groomed  ENMT: Moist oral mucosa, no pharyngeal injection or exudates  RESPIRATORY: Normal respiratory effort; lungs with positive crackles at bases bilaterally   CARDIOVASCULAR: Regular rate and rhythm, normal S1 and S2, no murmur/rub/gallop; No lower extremity edema; Peripheral pulses are 2+ bilaterally  ABDOMEN: Nontender to palpation, normoactive bowel sounds, no rebound/guarding; No hepatosplenomegaly  PSYCH: AAOx3, affect appropriate  NEUROLOGY: CNs grossly intact, no gross sensory deficits   SKIN: No rashes; no palpable lesions    LABS:                        12.1   6.19  )-----------( 570      ( 13 Apr 2020 09:50 )             37.6     04-13    138  |  99  |  15  ----------------------------<  94  5.0   |  24  |  0.7    Ca    8.8      13 Apr 2020 09:50    TPro  7.2  /  Alb  3.1<L>  /  TBili  0.4  /  DBili  x   /  AST  53<H>  /  ALT  44<H>  /  AlkPhos  236<H>  04-13          COVID-19 PCR: Detected (03 Apr 2020 19:00)      RADIOLOGY & ADDITIONAL TESTS:  < from: Xray Chest 1 View- PORTABLE-Routine (04.09.20 @ 07:43) >  EXAM:  XR CHEST PORTABLE ROUTINE 1V            PROCEDURE DATE:  04/09/2020          INTERPRETATION:  Clinical History / Reason for exam: COVID infection.    Comparison : Chest radiograph 4/8/2020.    Technique/Positioning: AP portable view. Jaw obscures superior mediastinum.    Findings:    Support devices: Loop recorder overlying the left chest.    Cardiac/mediastinum/hilum: Unremarkable.    Lung parenchyma/Pleura: Unchanged elevated right hemidiaphragm and bilateral opacities, right greaterthan left. No pneumothorax.    Skeleton/soft tissues: Unchanged.    Impression:      Unchanged bilateral opacities, right greater than left.               LOGAN LOZOYA M.D., RESIDENT RADIOLOGIST  This document has been electronically signed.  CLARENCE ELLIS M.D., ATTENDING RADIOLOGIST    < end of copied text >      	      Electrocardiogram/QTc Interval:    COORDINATION OF CARE:  Care Discussed with Consultants/Other Providers: Medicine Progress Note    Patient is a 65 yo Female with PMH of HTN who presented with a chief complaint of fever and cough found to be COVID-19 positive with elevated D-dimer. Transferred to [facility]. Currently stable on 2 L NC with 92% O2 saturation.       SUBJECTIVE / OVERNIGHT EVENTS: No acute overnight events c/o improving cough. Denies any CP, SOB, fever or chills.         MEDICATIONS  (STANDING):  enoxaparin Injectable 40 milliGRAM(s) SubCutaneous at bedtime  metoprolol tartrate 25 milliGRAM(s) Oral two times a day    MEDICATIONS  (PRN):  acetaminophen   Tablet .. 650 milliGRAM(s) Oral every 6 hours PRN Temp greater or equal to 38C (100.4F)        PHYSICAL EXAM:  Vital Signs Last 24 Hrs  T(C): 36.2 (14 Apr 2020 07:21), Max: 37.9 (14 Apr 2020 00:30)  T(F): 97.1 (14 Apr 2020 07:21), Max: 100.3 (14 Apr 2020 00:30)  HR: 88 (14 Apr 2020 07:21) (88 - 108)  BP: 142/67 (14 Apr 2020 07:21) (138/62 - 162/74)  BP(mean): 96 (14 Apr 2020 07:21) (96 - 96)  RR: 18 (14 Apr 2020 07:21) (16 - 19)  SpO2: 95% (14 Apr 2020 07:21) (91% - 97%)  CONSTITUTIONAL: NAD, well-developed, well-groomed  ENMT: Moist oral mucosa, no pharyngeal injection or exudates  RESPIRATORY: Normal respiratory effort; lungs with positive crackles at bases bilaterally   CARDIOVASCULAR: Regular rate and rhythm, normal S1 and S2, no murmur/rub/gallop; No lower extremity edema; Peripheral pulses are 2+ bilaterally  ABDOMEN: Nontender to palpation, normoactive bowel sounds, no rebound/guarding; No hepatosplenomegaly  PSYCH: AAOx3, affect appropriate  NEUROLOGY: CNs grossly intact, no gross sensory deficits   SKIN: No rashes; no palpable lesions    LABS:                        12.1   6.19  )-----------( 570      ( 13 Apr 2020 09:50 )             37.6     04-13    138  |  99  |  15  ----------------------------<  94  5.0   |  24  |  0.7    Ca    8.8      13 Apr 2020 09:50    TPro  7.2  /  Alb  3.1<L>  /  TBili  0.4  /  DBili  x   /  AST  53<H>  /  ALT  44<H>  /  AlkPhos  236<H>  04-13          COVID-19 PCR: Detected (03 Apr 2020 19:00)      RADIOLOGY & ADDITIONAL TESTS:  < from: Xray Chest 1 View- PORTABLE-Routine (04.09.20 @ 07:43) >  EXAM:  XR CHEST PORTABLE ROUTINE 1V            PROCEDURE DATE:  04/09/2020          INTERPRETATION:  Clinical History / Reason for exam: COVID infection.    Comparison : Chest radiograph 4/8/2020.    Technique/Positioning: AP portable view. Jaw obscures superior mediastinum.    Findings:    Support devices: Loop recorder overlying the left chest.    Cardiac/mediastinum/hilum: Unremarkable.    Lung parenchyma/Pleura: Unchanged elevated right hemidiaphragm and bilateral opacities, right greaterthan left. No pneumothorax.    Skeleton/soft tissues: Unchanged.    Impression:      Unchanged bilateral opacities, right greater than left.               LOGAN LOZOYA M.D., RESIDENT RADIOLOGIST  This document has been electronically signed.  CLARENCE ELLIS M.D., ATTENDING RADIOLOGIST    < end of copied text >      	      Electrocardiogram/QTc Interval:    COORDINATION OF CARE:  Care Discussed with Consultants/Other Providers:

## 2020-04-15 ENCOUNTER — TRANSCRIPTION ENCOUNTER (OUTPATIENT)
Age: 65
End: 2020-04-15

## 2020-04-15 RX ORDER — RIVAROXABAN 15 MG-20MG
1 KIT ORAL
Qty: 30 | Refills: 0
Start: 2020-04-15 | End: 2020-05-14

## 2020-04-15 RX ADMIN — ENOXAPARIN SODIUM 40 MILLIGRAM(S): 100 INJECTION SUBCUTANEOUS at 21:57

## 2020-04-15 RX ADMIN — Medication 25 MILLIGRAM(S): at 05:45

## 2020-04-15 RX ADMIN — Medication 25 MILLIGRAM(S): at 18:57

## 2020-04-15 RX ADMIN — Medication 650 MILLIGRAM(S): at 21:58

## 2020-04-15 NOTE — DISCHARGE NOTE PROVIDER - NSDCCPCAREPLAN_GEN_ALL_CORE_FT
PRINCIPAL DISCHARGE DIAGNOSIS  Diagnosis: COVID-19  Assessment and Plan of Treatment:       SECONDARY DISCHARGE DIAGNOSES  Diagnosis: HTN (hypertension)  Assessment and Plan of Treatment:

## 2020-04-15 NOTE — DISCHARGE NOTE NURSING/CASE MANAGEMENT/SOCIAL WORK - PATIENT PORTAL LINK FT
You can access the FollowMyHealth Patient Portal offered by [facility] by registering at the following website: [email or web address]. By joining [employer]’s FollowMyHealth portal, you will also be able to view your health information using other applications (apps) compatible with our system.

## 2020-04-15 NOTE — DISCHARGE NOTE NURSING/CASE MANAGEMENT/SOCIAL WORK - NSDCPEXARELTO_GEN_ALL_CORE
Rivaroxaban/Xarelto - Potential for adverse drug reactions and interactions/Rivaroxaban/Xarelto - Follow up monitoring/Rivaroxaban/Xarelto - Dietary Advice/Rivaroxaban/Xarelto - Compliance

## 2020-04-15 NOTE — DISCHARGE NOTE NURSING/CASE MANAGEMENT/SOCIAL WORK - NSDCPETBCESMAN_GEN_ALL_CORE
If you are a smoker, it is important for your health to stop smoking. Please be aware that second hand smoke is also harmful.
Normal for race

## 2020-04-15 NOTE — CHART NOTE - NSCHARTNOTEFT_GEN_A_CORE
Spoke with patient's son Lowell at [phone]. Discussed patient's status and treatment plan. Patient was febrile yesterday to 101.5, today hemodynamically stable and afebrile saturating adequately on 2L NC. No overnight issues. Denies any SOB, CP, dyspnea or cough. Will assess patient's saturation without supplemental oxygen today at rest and while ambulating. All questions answered. Likely discharge this afternoon if stable on room air. Son understands this and is in agreement.

## 2020-04-15 NOTE — DISCHARGE NOTE PROVIDER - HOSPITAL COURSE
Patient is a 65 yo Female with PMH of HTN who presented with a chief complaint of fever and cough found to be COVID-19 positive with elevated D-dimer. Completed treatment course with Plaquenil and Azithromycin. Patient did well and was transferred to [facility] on 4/12/2020 for continued monitoring. She is afebrile and hemodynamically stable with adequate O2 saturation on room air. Due to elevated D-Dimer and advanced age patient will be discharged on Xarelto PO daily for 1 month.     Please follow up with PMD Dr. Kwon in 2 weeks. Patient is a 63 yo Female with PMH of HTN who presented with a chief complaint of fever and cough found to be COVID-19 positive with elevated D-dimer. Completed treatment course with Plaquenil and Azithromycin. Patient did well and was transferred to [facility] on 4/12/2020 for continued monitoring. She is afebrile and hemodynamically stable with 2 L nasal cannula, saturating at 92 %. Due to elevated D-Dimer and advanced age patient will be discharged on Xarelto PO daily for 1 month.     Please follow up with PMD Dr. Kwon in 2 weeks. Patient is a 63 yo Female with PMH of HTN who presented with a chief complaint of fever and cough found to be COVID-19 positive with elevated D-dimer. Completed treatment course with Plaquenil and Azithromycin. Patient did well and was transferred to [facility] on 4/12/2020 for continued monitoring. She is afebrile and hemodynamically stable with 2 L nasal cannula, saturating at 92 %. Due to elevated D-Dimer and advanced age patient will be discharged on Xarelto PO daily for 1 month.     Discharged with home oxygen 2 L nasal cannula.    Please follow up with PMD Dr. Kwon in 2 weeks.

## 2020-04-15 NOTE — DISCHARGE NOTE PROVIDER - PROVIDER TOKENS
FREE:[LAST:[Doyle],FIRST:[Samy],PHONE:[(   )    -],FAX:[(   )    -],ADDRESS:[[facility] [address], [place] [address]  Phone: [phone]],FOLLOWUP:[2 weeks]]

## 2020-04-15 NOTE — DISCHARGE NOTE PROVIDER - CARE PROVIDER_API CALL
Samy Kwon  [address], [place] [address]  Phone: [phone]  Phone: (   )    -  Fax: (   )    -  Follow Up Time: 2 weeks

## 2020-04-15 NOTE — DISCHARGE NOTE PROVIDER - NSDCMRMEDTOKEN_GEN_ALL_CORE_FT
metoprolol succinate 25 mg oral tablet, extended release: 1 tab(s) orally once a day  NIFEdipine 30 mg oral tablet, extended release: 1 tab(s) orally once a day  Xarelto 10 mg oral tablet: 1 tab(s) orally once a day

## 2020-04-15 NOTE — PROGRESS NOTE ADULT - ASSESSMENT
64 y.o Icelandic speaking female with PMH of HTN admitted with cough and fever found to be COVID+ with elevated D-Dimer. Transferred to [facility] for continued monitoring. Febrile to 101.5 yesterday. Currently afebrile and stable on 2 L NC with 92% O2 saturation.       1. COVID-19   - d/c nasal cannula and assess oxygen saturation at rest and with ambulation   - completed Plaquenil/Azithromycin   - elevated D-dimer ->  (4/14/2020 2703), will be discharged on Xarelto  - likely d/c home, no needs      2. HTN  - continue current management with Metorprolol    3. DVT ppx  - continue Enoxaparin  - d/c on Xarelto    Patient was seen and examined with medical team and Dr. Bianchi 64 y.o Yakut speaking female with PMH of HTN admitted with cough and fever found to be COVID+ with elevated D-Dimer. Transferred to [facility] for continued monitoring. Febrile to 101.5 yesterday. Currently afebrile and stable on 2 L NC with 92% O2 saturation.       1. COVID-19   - d/c nasal cannula and assess oxygen saturation at rest and with ambulation   - completed Plaquenil/Azithromycin   - elevated D-dimer ->  (4/14/2020 2703 ->1133), will be discharged on Xarelto  - likely d/c home, no needs      2. HTN  - continue current management with Metorprolol    3. DVT ppx  - continue Enoxaparin  - d/c on Xarelto x 1 month    Patient was seen and examined with medical team and Dr. Bianchi

## 2020-04-15 NOTE — PROGRESS NOTE ADULT - SUBJECTIVE AND OBJECTIVE BOX
Medicine Progress Note  Patient is a 63 yo Female with PMH of HTN who presented with a chief complaint of fever and cough found to be COVID-19 positive with elevated D-dimer. Completed Plaquenil and Azithromycin. Transferred to [facility] on 4/12 for continued monitoring. Febrile to 101.5F yesterday which improved with Tylenol. Currently afebrile and stable on 2 L NC with 92% O2 saturation.       SUBJECTIVE / OVERNIGHT EVENTS: No acute overnight issues. Denies any cough, SOB, fever or chills.     ADDITIONAL REVIEW OF SYSTEMS: + left sided rib pain which improved with Tylenol     MEDICATIONS  (STANDING):  enoxaparin Injectable 40 milliGRAM(s) SubCutaneous at bedtime  metoprolol tartrate 25 milliGRAM(s) Oral two times a day    MEDICATIONS  (PRN):  acetaminophen   Tablet .. 650 milliGRAM(s) Oral every 6 hours PRN Temp greater or equal to 38C (100.4F)    CAPILLARY BLOOD GLUCOSE        I&O's Summary    14 Apr 2020 07:01  -  15 Apr 2020 07:00  --------------------------------------------------------  IN: 300 mL / OUT: 0 mL / NET: 300 mL        PHYSICAL EXAM:  Vital Signs Last 24 Hrs  T(C): 37.1 (15 Apr 2020 08:09), Max: 38.6 (14 Apr 2020 12:00)  T(F): 98.8 (15 Apr 2020 08:09), Max: 101.5 (14 Apr 2020 12:00)  HR: 76 (15 Apr 2020 08:09) (76 - 110)  BP: 120/77 (15 Apr 2020 08:09) (120/74 - 149/73)  BP(mean): --  RR: 16 (15 Apr 2020 08:09) (16 - 36)  SpO2: 92% (15 Apr 2020 08:09) (89% - 96%)  CONSTITUTIONAL: NAD, well-developed, well-groomed  ENMT: Moist oral mucosa, no pharyngeal injection or exudates  RESPIRATORY: Normal respiratory effort; lungs are clear to auscultation bilaterally with decreased BS at bases  CARDIOVASCULAR: Regular rate and rhythm, normal S1 and S2, no murmur/rub/gallop  ABDOMEN: Nontender to palpation, normoactive bowel sounds, no rebound/guarding  PSYCH: AAOx3, affect appropriate  EXTREMITIES: no calf tenderness, no edema   NEUROLOGY: AAOx3  SKIN: No rashes; no palpable lesions    LABS:   Complete Blood Count in AM (04.13.20 @ 09:50)    Nucleated RBC: 0 /100 WBCs    WBC Count: 6.19 K/uL    RBC Count: 4.25 M/uL    Hemoglobin: 12.1 g/dL    Hematocrit: 37.6 %    Mean Cell Volume: 88.5 fL    Mean Cell Hemoglobin: 28.5 pg    Mean Cell Hemoglobin Conc: 32.2 g/dL    Red Cell Distrib Width: 12.7 %    Platelet Count - Automated: 570 K/uL        COVID-19 PCR: Detected (03 Apr 2020 19:00)      RADIOLOGY & ADDITIONAL TESTS:  Imaging from Last 24 Hours: none          COORDINATION OF CARE:  Care Discussed with Consultants/Other Providers: Medicine Progress Note  Patient is a 63 yo Female with PMH of HTN who presented with a chief complaint of fever and cough found to be COVID-19 positive with elevated D-dimer. Completed Plaquenil and Azithromycin. Transferred to [facility] on 4/12 for continued monitoring. Febrile to 101.5F yesterday which improved with Tylenol. Currently afebrile and stable on 2 L NC with 92% O2 saturation.       SUBJECTIVE / OVERNIGHT EVENTS: No acute overnight issues. Denies any cough, SOB, fever or chills.     ADDITIONAL REVIEW OF SYSTEMS: + left sided rib pain which improved with Tylenol     MEDICATIONS  (STANDING):  enoxaparin Injectable 40 milliGRAM(s) SubCutaneous at bedtime  metoprolol tartrate 25 milliGRAM(s) Oral two times a day    MEDICATIONS  (PRN):  acetaminophen   Tablet .. 650 milliGRAM(s) Oral every 6 hours PRN Temp greater or equal to 38C (100.4F)    CAPILLARY BLOOD GLUCOSE        I&O's Summary    14 Apr 2020 07:01  -  15 Apr 2020 07:00  --------------------------------------------------------  IN: 300 mL / OUT: 0 mL / NET: 300 mL        PHYSICAL EXAM:  Vital Signs Last 24 Hrs  T(C): 37.1 (15 Apr 2020 08:09), Max: 38.6 (14 Apr 2020 12:00)  T(F): 98.8 (15 Apr 2020 08:09), Max: 101.5 (14 Apr 2020 12:00)  HR: 76 (15 Apr 2020 08:09) (76 - 110)  BP: 120/77 (15 Apr 2020 08:09) (120/74 - 149/73)  BP(mean): --  RR: 16 (15 Apr 2020 08:09) (16 - 36)  SpO2: 92% (15 Apr 2020 08:09) (89% - 96%)  CONSTITUTIONAL: NAD, well-developed, well-groomed  ENMT: Moist oral mucosa, no pharyngeal injection or exudates  RESPIRATORY: Normal respiratory effort; lungs are clear to auscultation bilaterally with decreased BS at bases  CARDIOVASCULAR: Regular rate and rhythm, normal S1 and S2, no murmur/rub/gallop  ABDOMEN: Nontender to palpation, normoactive bowel sounds, no rebound/guarding  PSYCH: AAOx3, affect appropriate  EXTREMITIES: no calf tenderness, no edema   NEUROLOGY: AAOx3  SKIN: No rashes; no palpable lesions    LABS:   Complete Blood Count in AM (04.13.20 @ 09:50)    Nucleated RBC: 0 /100 WBCs    WBC Count: 6.19 K/uL    RBC Count: 4.25 M/uL    Hemoglobin: 12.1 g/dL    Hematocrit: 37.6 %    Mean Cell Volume: 88.5 fL    Mean Cell Hemoglobin: 28.5 pg    Mean Cell Hemoglobin Conc: 32.2 g/dL    Red Cell Distrib Width: 12.7 %    Platelet Count - Automated: 570 K/uL      D-Dimer Assay, Quantitative (04.14.20 @ 08:35)    D-Dimer Assay, Quantitative: 1133 ng/mL DDU        COVID-19 PCR: Detected (03 Apr 2020 19:00)      RADIOLOGY & ADDITIONAL TESTS:  Imaging from Last 24 Hours: none          COORDINATION OF CARE:  Care Discussed with Consultants/Other Providers:

## 2020-04-16 VITALS — SYSTOLIC BLOOD PRESSURE: 148 MMHG | OXYGEN SATURATION: 98 % | DIASTOLIC BLOOD PRESSURE: 80 MMHG | TEMPERATURE: 98 F

## 2020-04-16 RX ADMIN — Medication 25 MILLIGRAM(S): at 06:21

## 2020-04-16 RX ADMIN — Medication 25 MILLIGRAM(S): at 16:14

## 2020-04-16 NOTE — PROGRESS NOTE ADULT - SUBJECTIVE AND OBJECTIVE BOX
Medicine Progress Note    Patient is a 64y old  Female who presents with a chief complaint of COVID-19 (15 Apr 2020 12:22)      SUBJECTIVE / OVERNIGHT EVENTS:  Patient sitting up in chair on 2 L nasal cannula comfortably. Denies SOB, chest pain, and palpitations. No events overnight.      ADDITIONAL REVIEW OF SYSTEMS:    MEDICATIONS  (STANDING):  enoxaparin Injectable 40 milliGRAM(s) SubCutaneous at bedtime  metoprolol tartrate 25 milliGRAM(s) Oral two times a day    MEDICATIONS  (PRN):  acetaminophen   Tablet .. 650 milliGRAM(s) Oral every 6 hours PRN Temp greater or equal to 38C (100.4F)    CAPILLARY BLOOD GLUCOSE        I&O's Summary      PHYSICAL EXAM:  Vital Signs Last 24 Hrs  T(C): 36.5 (16 Apr 2020 04:27), Max: 37.3 (15 Apr 2020 16:00)  T(F): 97.7 (16 Apr 2020 04:27), Max: 99.1 (15 Apr 2020 16:00)  HR: 77 (16 Apr 2020 04:27) (76 - 107)  BP: 156/76 (16 Apr 2020 04:27) (124/73 - 156/76)  RR: 24 (16 Apr 2020 04:27) (16 - 24)  SpO2: 91%on 2 L NC(16 Apr 2020 04:27) (88% - 92%)    CONSTITUTIONAL: NAD, well-developed  ENMT: Moist oral mucosa, no pharyngeal injection or exudates; normal dentition  RESPIRATORY: Normal respiratory effort; lungs are clear to auscultation bilaterally  CARDIOVASCULAR: Regular rate and rhythm, normal S1 and S2, no murmur/rub/gallop; No lower extremity edema; Peripheral pulses are 2+ bilaterally  ABDOMEN: Nontender to palpation, normoactive bowel sounds, no rebound/guarding; No hepatosplenomegaly  PSYCH: A+O to person, place, and time; affect appropriate  NEUROLOGY: CN 2-12 are intact and symmetric; no gross sensory deficits   SKIN: No rashes; no palpable lesions    LABS:        D-Dimer Assay, Quantitative (04.14.20 @ 08:35)    D-Dimer Assay, Quantitative: 1133 ng/mL DDU    Complete Blood Count in AM (04.13.20 @ 09:50)    Nucleated RBC: 0 /100 WBCs    WBC Count: 6.19 K/uL    RBC Count: 4.25 M/uL    Hemoglobin: 12.1 g/dL    Hematocrit: 37.6 %    Mean Cell Volume: 88.5 fL    Mean Cell Hemoglobin: 28.5 pg    Mean Cell Hemoglobin Conc: 32.2 g/dL    Red Cell Distrib Width: 12.7 %    Platelet Count - Automated: 570 K/uL                COVID-19 PCR: Detected (03 Apr 2020 19:00)      RADIOLOGY & ADDITIONAL TESTS:  Imaging from Last 24 Hours:    Electrocardiogram/QTc Interval:    COORDINATION OF CARE:  Care Discussed with Consultants/Other Providers: Medicine Progress Note    Patient is a 64y old  Female who presents with a chief complaint of COVID-19 (15 Apr 2020 12:22)      SUBJECTIVE / OVERNIGHT EVENTS:  Patient sitting up in chair on 2 L nasal cannula comfortably. Denies SOB, chest pain, and palpitations. No events overnight.      ADDITIONAL REVIEW OF SYSTEMS:    MEDICATIONS  (STANDING):  enoxaparin Injectable 40 milliGRAM(s) SubCutaneous at bedtime  metoprolol tartrate 25 milliGRAM(s) Oral two times a day    MEDICATIONS  (PRN):  acetaminophen   Tablet .. 650 milliGRAM(s) Oral every 6 hours PRN Temp greater or equal to 38C (100.4F)    CAPILLARY BLOOD GLUCOSE        I&O's Summary      PHYSICAL EXAM:      Vital Signs Last 24 Hrs  T(C): 36.5 (16 Apr 2020 04:27), Max: 37.3 (15 Apr 2020 16:00)  T(F): 97.7 (16 Apr 2020 04:27), Max: 99.1 (15 Apr 2020 16:00)  HR: 77 (16 Apr 2020 04:27) (77 - 107)  BP: 156/76 (16 Apr 2020 04:27) (136/76 - 156/76)  BP(mean): --  RR: 24 (16 Apr 2020 04:27) (16 - 24)  SpO2: 91% (16 Apr 2020 04:27) (88% - 91%)        CONSTITUTIONAL: NAD, well-developed  ENMT: Moist oral mucosa, no pharyngeal injection or exudates; normal dentition  RESPIRATORY: Normal respiratory effort; lungs are clear to auscultation bilaterally  CARDIOVASCULAR: Regular rate and rhythm, normal S1 and S2, no murmur/rub/gallop; No lower extremity edema; Peripheral pulses are 2+ bilaterally  ABDOMEN: Nontender to palpation, normoactive bowel sounds, no rebound/guarding; No hepatosplenomegaly  PSYCH: A+O to person, place, and time; affect appropriate  NEUROLOGY: CN 2-12 are intact and symmetric; no gross sensory deficits   SKIN: No rashes; no palpable lesions    LABS:        D-Dimer Assay, Quantitative (04.14.20 @ 08:35)    D-Dimer Assay, Quantitative: 1133 ng/mL DDU    Complete Blood Count in AM (04.13.20 @ 09:50)    Nucleated RBC: 0 /100 WBCs    WBC Count: 6.19 K/uL    RBC Count: 4.25 M/uL    Hemoglobin: 12.1 g/dL    Hematocrit: 37.6 %    Mean Cell Volume: 88.5 fL    Mean Cell Hemoglobin: 28.5 pg    Mean Cell Hemoglobin Conc: 32.2 g/dL    Red Cell Distrib Width: 12.7 %    Platelet Count - Automated: 570 K/uL                COVID-19 PCR: Detected (03 Apr 2020 19:00)      RADIOLOGY & ADDITIONAL TESTS:  Imaging from Last 24 Hours:    Electrocardiogram/QTc Interval:    COORDINATION OF CARE:  Care Discussed with Consultants/Other Providers:

## 2020-04-16 NOTE — PROGRESS NOTE ADULT - ASSESSMENT
64 y.o Slovenian speaking female with PMH of HTN admitted with cough and fever found to be COVID+ with elevated D-Dimer. Transferred to [facility] for continued monitoring. Currently afebrile and stable on 2 L NC with 92% O2 saturation.       1. COVID-19   - assess oxygen saturation at rest and with ambulation   - completed Plaquenil/Azithromycin   - elevated D-dimer ->  (4/14/2020 2703 ->1133), will be discharged on Xarelto      2. HTN  - continue current management with Metorprolol    3. DVT ppx  - continue Enoxaparin  - d/c on Xarelto x 1 month 64 y.o Urdu speaking female with PMH of HTN admitted with cough and fever found to be COVID+ with elevated D-Dimer. Transferred to [facility] for continued monitoring. Currently afebrile and stable on 2 L NC with 92% O2 saturation.           1. COVID-19   - assess oxygen saturation at rest and with ambulation   - completed Plaquenil/Azithromycin   - elevated D-dimer ->  (4/14/2020 2703 ->1133), will be discharged on Xarelto    2. SOB  -discharged home on 2 L NC , 92 % pulse oximeter  -Pulse ox 88 % at rest  -Pulse ox 82 % on ambulation      3. HTN  - continue current management with Metorprolol    4. DVT ppx  - continue Enoxaparin  - d/c on Xarelto x 1 month

## 2020-04-23 DIAGNOSIS — J12.89 OTHER VIRAL PNEUMONIA: ICD-10-CM

## 2020-04-23 DIAGNOSIS — R09.02 HYPOXEMIA: ICD-10-CM

## 2020-04-23 DIAGNOSIS — I10 ESSENTIAL (PRIMARY) HYPERTENSION: ICD-10-CM

## 2020-04-23 DIAGNOSIS — U07.1 COVID-19: ICD-10-CM

## 2020-04-23 DIAGNOSIS — J96.01 ACUTE RESPIRATORY FAILURE WITH HYPOXIA: ICD-10-CM

## 2020-04-23 DIAGNOSIS — R79.1 ABNORMAL COAGULATION PROFILE: ICD-10-CM

## 2021-07-28 NOTE — ED ADULT NURSE NOTE - PRO INTERPRETER NEED 2
----- Message from Kiara Dillon sent at 7/28/2021  1:56 PM EDT -----  Subject: Referral Request    QUESTIONS   Reason for referral request? Patient has appointment on 8/11/2021 would   like order from routine labs to be placed so she can get labs drawn before   appt. Has the physician seen you for this condition before? No   Preferred Specialist (if applicable)? Do you already have an appointment scheduled? Yes  Select Scheduled Date? 2021-08-11  Select Scheduled Physician? Yuki Castaneda   Additional Information for Provider?   ---------------------------------------------------------------------------  --------------  Federico WYMAN  What is the best way for the office to contact you? Do not leave any   message, patient will call back for answer  Preferred Call Back Phone Number?  [phone] English

## 2021-09-07 NOTE — DISCHARGE NOTE PROVIDER - NSDCFUADDINST_GEN_ALL_CORE_FT
Other (Free Text): no suspicious lesions noted on skin examination The symptoms you have been experiencing are due to infection with the novel coronavirus and subsequent development of COVID-19. Symptoms of the illness include fever, malaise, dry cough, and occasionally diarrhea, headache, loss of appetite, and disturbances in taste/smell. The virus is spread through respiratory droplets and contact with contaminated items/surfaces.    Your oxygen requirements have improved such that you do not require supplemental oxygen on ambulation. You are currently medically cleared for discharge. Please continue to self-quarantine for another 7 days. Wash your hands thoroughly (for at least 20 seconds with soap and warm water) and frequently. Avoid touching your face and cover your cough/sneeze. Wear a face mask if going outside and avoid close contact with others for at least 7 days.    If you experience any worsening or recurrence of your symptoms, particularly worsening or high fever, shortness of breath, extreme fatigue, bloody cough, chest pain, palpitations, if you are unable to keep down food/fluids, or if you experience dizziness, or fainting, please call 9-1-1 immediately or report to the nearest Emergency Department. If you have any questions or concerns, please do not hesitate to call the hospital at [phone].      Please also note that you are being discharged with a prescription for Xarelto to reduce the risk of blood clots. Please take Xarelto 10mg daily for 30 days. Please seek immediate medical care by calling 911 and going to the hospital if you develop blood in stools or other signs of bleeding. The symptoms you have been experiencing are due to infection with the novel coronavirus and subsequent development of COVID-19. Symptoms of the illness include fever, malaise, dry cough, and occasionally diarrhea, headache, loss of appetite, and disturbances in taste/smell. The virus is spread through respiratory droplets and contact with contaminated items/surfaces.    Your oxygen requirements have improved such that you do not require supplemental oxygen on ambulation. You are currently medically cleared for discharge. Please continue to self-quarantine for another 7 days. Wash your hands thoroughly (for at least 20 seconds with soap and warm water) and frequently. Avoid touching your face and cover your cough/sneeze. Wear a face mask if going outside and avoid close contact with others for at least 7 days.    Please also note that you are being discharged with a prescription for Xarelto to reduce the risk of blood clots. Please take Xarelto 10mg daily for 30 days. Please seek immediate medical care by calling 911 and going to the hospital if you develop blood in stools or other signs of bleeding.  If you experience any worsening or recurrence of your symptoms, particularly worsening or high fever, shortness of breath, extreme fatigue, bloody cough, chest pain, palpitations, if you are unable to keep down food/fluids, or if you experience dizziness, or fainting, please call 9-1-1 immediately or report to the nearest Emergency Department. If you have any questions or concerns, please do not hesitate to call the hospital at [phone].      Please also note that you are being discharged with a prescription for Xarelto to reduce the risk of blood clots. Please take Xarelto 10mg daily for 30 days. Please seek immediate medical care by calling 911 and going to the hospital if you develop blood in stools or other signs of bleeding.

## 2024-02-02 NOTE — H&P ADULT - DOES THIS PATIENT HAVE A HISTORY OF OR HAS BEEN DX WITH HEART FAILURE?
Pt arrives to ED due to a fall last night. Pt hit her head on the fireplace at home. Pt has a left black eye/swelling, and lac to the top of head. Pt coming from home, had davis called for a lift assist.     Past Medical History:   Diagnosis Date    Anemia     iron    Depression     Dyslipidemia     Endometriosis 08/14/1998    3 Lupron Depot injections 2002    Gastroesophageal reflux disease     Hemorrhoids     Human papillomavirus in conditions classified elsewhere and of unspecified site     warts occasionally    Hypertension     borderline    Myocardial infarction (CMD) 2020    Ovarian Cyst 08/14/1998    Hx of bilateral ovarian cysts. 2 cysts noted left ovary per Ultrasound 12/07/2007        no